# Patient Record
Sex: FEMALE | Race: WHITE | NOT HISPANIC OR LATINO | Employment: OTHER | URBAN - METROPOLITAN AREA
[De-identification: names, ages, dates, MRNs, and addresses within clinical notes are randomized per-mention and may not be internally consistent; named-entity substitution may affect disease eponyms.]

---

## 2022-10-12 ENCOUNTER — OFFICE VISIT (OUTPATIENT)
Dept: UROLOGY | Facility: CLINIC | Age: 72
End: 2022-10-12
Attending: OBSTETRICS & GYNECOLOGY
Payer: MEDICARE

## 2022-10-12 VITALS — HEART RATE: 60 BPM | DIASTOLIC BLOOD PRESSURE: 94 MMHG | HEIGHT: 65 IN | SYSTOLIC BLOOD PRESSURE: 163 MMHG

## 2022-10-12 DIAGNOSIS — N89.6: ICD-10-CM

## 2022-10-12 DIAGNOSIS — N39.46 MIXED INCONTINENCE URGE AND STRESS: Primary | ICD-10-CM

## 2022-10-12 PROCEDURE — 99204 OFFICE O/P NEW MOD 45 MIN: CPT | Performed by: OBSTETRICS & GYNECOLOGY

## 2022-10-12 PROCEDURE — G0463 HOSPITAL OUTPT CLINIC VISIT: HCPCS

## 2022-10-12 RX ORDER — CELECOXIB 200 MG/1
CAPSULE ORAL
COMMUNITY

## 2022-10-12 RX ORDER — MOMETASONE FUROATE MONOHYDRATE 50 UG/1
2 SPRAY, METERED NASAL PRN
COMMUNITY

## 2022-10-12 RX ORDER — LISINOPRIL 40 MG/1
30 TABLET ORAL
COMMUNITY
Start: 2022-10-05

## 2022-10-12 RX ORDER — BIMATOPROST 3 UG/ML
SOLUTION TOPICAL
COMMUNITY
Start: 2022-09-06

## 2022-10-12 RX ORDER — CALCIUM CARBONATE 500(1250)
200-400 TABLET,CHEWABLE ORAL
COMMUNITY

## 2022-10-12 RX ORDER — ESTRADIOL 1 MG/1
1 TABLET ORAL
COMMUNITY
Start: 2021-10-21 | End: 2023-07-01

## 2022-10-12 RX ORDER — LEVOTHYROXINE SODIUM 112 UG/1
112 TABLET ORAL
COMMUNITY
Start: 2022-07-01

## 2022-10-12 RX ORDER — CETIRIZINE HYDROCHLORIDE 10 MG/1
10 TABLET ORAL
COMMUNITY

## 2022-10-12 RX ORDER — VALACYCLOVIR HYDROCHLORIDE 1 G/1
TABLET, FILM COATED ORAL
COMMUNITY
Start: 2022-03-24

## 2022-10-12 RX ORDER — SOLIFENACIN SUCCINATE 10 MG/1
10 TABLET, FILM COATED ORAL DAILY
COMMUNITY

## 2022-10-12 RX ORDER — TRETINOIN 0.25 MG/G
CREAM TOPICAL
COMMUNITY

## 2022-10-12 RX ORDER — ATORVASTATIN CALCIUM 20 MG/1
20 TABLET, FILM COATED ORAL PRN
COMMUNITY
Start: 2022-07-01 | End: 2024-08-29

## 2022-10-12 RX ORDER — ASPIRIN 81 MG/1
81 TABLET, CHEWABLE ORAL
COMMUNITY
Start: 2022-05-14

## 2022-10-12 RX ORDER — GUAIFENESIN 600 MG/1
600 TABLET, EXTENDED RELEASE ORAL
COMMUNITY

## 2022-10-12 ASSESSMENT — PAIN SCALES - GENERAL: PAINLEVEL: NO PAIN (0)

## 2022-10-12 NOTE — PROGRESS NOTES
October 12, 2022    Referring Provider: Referred Self, MD  No address on file    Primary Care Provider: Brittnee Pacheco    CC: urinary incontinence    HPI:  Daisy Serna is a 72 year old female who presents for evaluation of her pelvic floor symptoms.  She has undergone 3 slings for BHAVANA, and a posterior repair for a rectocele. She now has mixed urinary incontinence, U>S. She has tried multiple meds for her UUI, without much success, currently on vesicare    She also feels that her posterior repair left her with a narrowed vaginal opening that will sometimes tear when she is constipated and needs to bear down to have a BM.     Prolapse:  Do you feel a vaginal bulge? no                                      Pressure? no   Do you have to place your fingers in the vagina or in the rectum to have a bowel movement? -  Impact to quality of life? -     Stress Incontinence:  Do you leak urine with cough, sneeze, exercise? yes  How often do you leak with cough, sneeze, exercise?  daily  How much do you usually leak? More than drops   Do you wear a pad? yes If so; large  Impact to quality of life? moderate    Urge Incontinence:  Do you often get sudden urges to urinate? yes  How often do have urges? daily  If so, do you leak with these urges? yes  How much do you usually leak? More than drops  Impact to quality of life? moderate    Prior therapy:  Ever done pelvic floor physical therapy? yes  Trial of medication? yes  Have you ever tried a pessary? no    Medical History:  Do you have?       Past Medical History:   Diagnosis Date     Mixed incontinence      Rectocele      Seasonal allergic rhinitis        Past Surgical History:   Procedure Laterality Date     HYSTERECTOMY      with sling     ooperectomy Bilateral      RECTOCELE REPAIR  2018       Social History     Socioeconomic History     Marital status:      Spouse name: Not on file     Number of children: Not on file     Years of education: Not on file      Highest education level: Not on file   Occupational History     Not on file   Tobacco Use     Smoking status: Never     Smokeless tobacco: Never   Vaping Use     Vaping Use: Never used   Substance and Sexual Activity     Alcohol use: Yes     Alcohol/week: 2.0 standard drinks     Types: 2 Standard drinks or equivalent per week     Drug use: Never     Sexual activity: Not on file   Other Topics Concern     Not on file   Social History Narrative     Not on file     Social Determinants of Health     Financial Resource Strain: Not on file   Food Insecurity: Not on file   Transportation Needs: Not on file   Physical Activity: Not on file   Stress: Not on file   Social Connections: Not on file   Intimate Partner Violence: Not on file   Housing Stability: Not on file       No family history on file.    ROS    Allergies   Allergen Reactions     Adhesive Tape Rash     Extract Of Poison Ivy Itching     Treats with prednisone p.r.n. due to significant reaction  Treats with prednisone p.r.n. due to significant reaction       Gramineae Pollens Other (See Comments) and Unknown     Itchy throat, cough, runny nose  Itchy throat, cough, runny nose         Current Outpatient Medications   Medication     aspirin (ASA) 81 MG chewable tablet     atorvastatin (LIPITOR) 20 MG tablet     bimatoprost (LATISSE) 0.03 % external opthalmic solution     estradiol (ESTRACE) 1 MG tablet     levothyroxine (SYNTHROID/LEVOTHROID) 112 MCG tablet     lisinopril (ZESTRIL) 40 MG tablet     solifenacin (VESICARE) 10 MG tablet     valACYclovir (VALTREX) 1000 mg tablet     Azelaic Acid 15 % FOAM     calcium carbonate 500 mg, elemental, 1250 (500 Ca) MG tablet chewable     celecoxib (CELEBREX) 200 MG capsule     cetirizine (ZYRTEC) 10 MG tablet     guaiFENesin (MUCINEX) 600 MG 12 hr tablet     mometasone (NASONEX) 50 MCG/ACT nasal spray     omeprazole (PRILOSEC) 20 MG DR capsule     tretinoin (RETIN-A) 0.025 % external cream     No current  facility-administered medications for this visit.       There were no vitals taken for this visit. No LMP recorded. There is no height or weight on file to calculate BMI.  Dr. Helton is alert, comfortable in no acute distress, non-labored breathing.   Abdomen is soft, non-tender, non-distended, no CVAT.    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 The urethra was normal appearing.    She has good support on supine strain.  Unable to do a speculum exam due to her vaginal narrowing     A/P: Daisy KEEN DanieTrevor is a 72 year old F with Mixed urinary incontinence U>S    We discussed the diagnosis and treatment options. Pt has urgency and urge incontinence. Treatment options to include:  1) observation with f/u in 6 -12 months  2) pelvic floor physical therapy and bladder training  3) anti-cholinergic medications  4) PTNS  5) Botox injections  6) SNM    Pt would like to try PTNS. She lives in [place] and will check to see if it is available in that area. Will set up for [facility] if it is not.    I spent a total of 45 minutes with  Dr. Helton  on the date of the encounter in chart review, face to face patient visit, review of tests, documentation and/or discussion with other providers about the issues documented above.    Pratik Naranjo MD  Professor, OB/GYN  Urogynecologist  CC  Patient Care Team:  Brittnee Pacheco MD as PCP - General (Family Medicine)  Pratik Naranjo MD as MD (OB/Gyn)  SELF, REFERRED

## 2022-10-12 NOTE — LETTER
10/12/2022       RE: Daisy Serna  [address]  [facility] [address]     Dear Colleague,    Thank you for referring your patient, Daisy Serna, to the [facility] [place] [facility]. Please see a copy of my visit note below.    October 12, 2022    Referring Provider: Referred Self, MD  No address on file    Primary Care Provider: Brittnee Pacheco    CC: urinary incontinence    HPI:  Daisy Serna is a 72 year old female who presents for evaluation of her pelvic floor symptoms.  She has undergone 3 slings for BHAVANA, and a posterior repair for a rectocele. She now has mixed urinary incontinence, U>S. She has tried multiple meds for her UUI, without much success, currently on vesicare    She also feels that her posterior repair left her with a narrowed vaginal opening that will sometimes tear when she is constipated and needs to bear down to have a BM.     Prolapse:  Do you feel a vaginal bulge? no                                      Pressure? no   Do you have to place your fingers in the vagina or in the rectum to have a bowel movement? -  Impact to quality of life? -     Stress Incontinence:  Do you leak urine with cough, sneeze, exercise? yes  How often do you leak with cough, sneeze, exercise?  daily  How much do you usually leak? More than drops   Do you wear a pad? yes If so; large  Impact to quality of life? moderate    Urge Incontinence:  Do you often get sudden urges to urinate? yes  How often do have urges? daily  If so, do you leak with these urges? yes  How much do you usually leak? More than drops  Impact to quality of life? moderate    Prior therapy:  Ever done pelvic floor physical therapy? yes  Trial of medication? yes  Have you ever tried a pessary? no    Medical History:  Do you have?       Past Medical History:   Diagnosis Date     Mixed incontinence      Rectocele      Seasonal allergic rhinitis         Past Surgical History:   Procedure Laterality Date     HYSTERECTOMY      with sling     ooperectomy Bilateral      RECTOCELE REPAIR  2018       Social History     Socioeconomic History     Marital status:      Spouse name: Not on file     Number of children: Not on file     Years of education: Not on file     Highest education level: Not on file   Occupational History     Not on file   Tobacco Use     Smoking status: Never     Smokeless tobacco: Never   Vaping Use     Vaping Use: Never used   Substance and Sexual Activity     Alcohol use: Yes     Alcohol/week: 2.0 standard drinks     Types: 2 Standard drinks or equivalent per week     Drug use: Never     Sexual activity: Not on file   Other Topics Concern     Not on file   Social History Narrative     Not on file     Social Determinants of Health     Financial Resource Strain: Not on file   Food Insecurity: Not on file   Transportation Needs: Not on file   Physical Activity: Not on file   Stress: Not on file   Social Connections: Not on file   Intimate Partner Violence: Not on file   Housing Stability: Not on file       No family history on file.    ROS    Allergies   Allergen Reactions     Adhesive Tape Rash     Extract Of Poison Ivy Itching     Treats with prednisone p.r.n. due to significant reaction  Treats with prednisone p.r.n. due to significant reaction       Gramineae Pollens Other (See Comments) and Unknown     Itchy throat, cough, runny nose  Itchy throat, cough, runny nose         Current Outpatient Medications   Medication     aspirin (ASA) 81 MG chewable tablet     atorvastatin (LIPITOR) 20 MG tablet     bimatoprost (LATISSE) 0.03 % external opthalmic solution     estradiol (ESTRACE) 1 MG tablet     levothyroxine (SYNTHROID/LEVOTHROID) 112 MCG tablet     lisinopril (ZESTRIL) 40 MG tablet     solifenacin (VESICARE) 10 MG tablet     valACYclovir (VALTREX) 1000 mg tablet     Azelaic Acid 15 % FOAM     calcium carbonate 500 mg, elemental,  1250 (500 Ca) MG tablet chewable     celecoxib (CELEBREX) 200 MG capsule     cetirizine (ZYRTEC) 10 MG tablet     guaiFENesin (MUCINEX) 600 MG 12 hr tablet     mometasone (NASONEX) 50 MCG/ACT nasal spray     omeprazole (PRILOSEC) 20 MG DR capsule     tretinoin (RETIN-A) 0.025 % external cream     No current facility-administered medications for this visit.       There were no vitals taken for this visit. No LMP recorded. There is no height or weight on file to calculate BMI.  Dr. Helton is alert, comfortable in no acute distress, non-labored breathing.   Abdomen is soft, non-tender, non-distended, no CVAT.    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 The urethra was normal appearing.    She has good support on supine strain.  Unable to do a speculum exam due to her vaginal narrowing     A/P: Daisy KEEN Daphne is a 72 year old F with Mixed urinary incontinence U>S    We discussed the diagnosis and treatment options. Pt has urgency and urge incontinence. Treatment options to include:  1) observation with f/u in 6 -12 months  2) pelvic floor physical therapy and bladder training  3) anti-cholinergic medications  4) PTNS  5) Botox injections  6) SNM    Pt would like to try PTNS. She lives in [place] and will check to see if it is available in that area. Will set up for [facility] if it is not.    I spent a total of 45 minutes with  Dr. Helton  on the date of the encounter in chart review, face to face patient visit, review of tests, documentation and/or discussion with other providers about the issues documented above.    Pratik Naranjo MD  Professor, OB/GYN  Urogynecologist  CC  Patient Care Team:  Brittnee Pacheco MD as PCP - General (Family Medicine)  Pratik Naranjo MD as MD (OB/Gyn)  SELF, REFERRED

## 2022-10-13 ENCOUNTER — TELEPHONE (OUTPATIENT)
Dept: UROLOGY | Facility: CLINIC | Age: 72
End: 2022-10-13

## 2022-10-13 DIAGNOSIS — S39.94XS: Primary | ICD-10-CM

## 2022-10-13 NOTE — TELEPHONE ENCOUNTER
[facility] Call Center    Phone Message    May a detailed message be left on voicemail: yes     Reason for Call: Pt returning call after appt yesterday. She has decided to go forward with PTNS treatments and minor surgery procedure that was discussed. Please call pt to set up. Thank     Action Taken: Message routed to:  [facility] Uro    Travel Screening: Not Applicable

## 2022-10-14 ENCOUNTER — OFFICE VISIT (OUTPATIENT)
Dept: UROLOGY | Facility: CLINIC | Age: 72
End: 2022-10-14
Payer: MEDICARE

## 2022-10-14 DIAGNOSIS — N39.46 MIXED INCONTINENCE URGE AND STRESS: Primary | ICD-10-CM

## 2022-10-14 DIAGNOSIS — N32.81 OVERACTIVE BLADDER: ICD-10-CM

## 2022-10-14 PROBLEM — N18.30 CKD (CHRONIC KIDNEY DISEASE) STAGE 3, GFR 30-59 ML/MIN (H): Status: ACTIVE | Noted: 2019-06-16

## 2022-10-14 PROBLEM — M19.90 OSTEOARTHROSIS: Status: ACTIVE | Noted: 2022-10-14

## 2022-10-14 PROBLEM — N81.10 CYSTOCELE WITH RECTOCELE: Status: ACTIVE | Noted: 2018-08-24

## 2022-10-14 PROBLEM — K63.5 COLON POLYP: Status: ACTIVE | Noted: 2022-10-14

## 2022-10-14 PROBLEM — E03.9 ACQUIRED HYPOTHYROIDISM: Status: ACTIVE | Noted: 2021-10-01

## 2022-10-14 PROBLEM — I73.00 RAYNAUD'S DISEASE: Status: ACTIVE | Noted: 2021-10-01

## 2022-10-14 PROBLEM — N81.6 CYSTOCELE WITH RECTOCELE: Status: ACTIVE | Noted: 2018-08-24

## 2022-10-14 PROCEDURE — 64566 NEUROELTRD STIM POST TIBIAL: CPT

## 2022-10-14 NOTE — PATIENT INSTRUCTIONS
Please schedule the rest of your 12 weekly PTNS treatment.    It was a pleasure meeting with you today.  Thank you for allowing me and my team the privilege of caring for you today.  YOU are the reason we are here, and I truly hope we provided you with the excellent service you deserve.  Please let us know if there is anything else we can do for you so that we can be sure you are leaving completely satisfied with your care experience.      Joelle Pires, CMA

## 2022-10-14 NOTE — PROGRESS NOTES
Chief Complaint   Patient presents with     Allied Health Visit     PTNS #1 of 12       There is no problem list on file for this patient.      Allergies   Allergen Reactions     Adhesive Tape Rash     Extract Of Poison Ivy Itching     Treats with prednisone p.r.n. due to significant reaction  Treats with prednisone p.r.n. due to significant reaction       Gramineae Pollens Other (See Comments) and Unknown     Itchy throat, cough, runny nose  Itchy throat, cough, runny nose         Current Outpatient Medications   Medication Sig Dispense Refill     aspirin (ASA) 81 MG chewable tablet Take 81 mg by mouth       atorvastatin (LIPITOR) 20 MG tablet Take 20 mg by mouth as needed       Azelaic Acid 15 % FOAM        bimatoprost (LATISSE) 0.03 % external opthalmic solution        calcium carbonate 500 mg, elemental, 1250 (500 Ca) MG tablet chewable Take 200-400 mg by mouth       celecoxib (CELEBREX) 200 MG capsule        cetirizine (ZYRTEC) 10 MG tablet Take 10 mg by mouth       estradiol (ESTRACE) 1 MG tablet Take 1 mg by mouth       guaiFENesin (MUCINEX) 600 MG 12 hr tablet Take 600 mg by mouth       levothyroxine (SYNTHROID/LEVOTHROID) 112 MCG tablet Take 112 mcg by mouth every 48 hours       lisinopril (ZESTRIL) 40 MG tablet Take 40 mg by mouth       mometasone (NASONEX) 50 MCG/ACT nasal spray Spray 2 sprays in nostril as needed       omeprazole (PRILOSEC) 20 MG DR capsule Take 20 mg by mouth       solifenacin (VESICARE) 10 MG tablet Take 10 mg by mouth daily       tretinoin (RETIN-A) 0.025 % external cream        valACYclovir (VALTREX) 1000 mg tablet daily as needed.         Social History     Tobacco Use     Smoking status: Never     Smokeless tobacco: Never   Vaping Use     Vaping Use: Never used   Substance Use Topics     Alcohol use: Yes     Alcohol/week: 2.0 standard drinks     Types: 2 Standard drinks or equivalent per week     Drug use: Never       Daisy Serna comes into clinic today at the request  of Referred Self for PTNS treatment.    This service provided today was under the direct supervision of Dr. Bozena Ledesma, who was available if needed.    Percutaneous Tibial Nerve Stimulation (PTNS)    Treatment number : 1    Percutaneous tibial nerve stimulation (PTNS) was prescribed for Daisy HeltonDanaHosea's Overactive Bladder symptoms of urge incontinence. The needle electrode was inserted into the lower, inner aspect of the right leg. The surface electrode was placed on the inside arch of the foot on the treatment leg. The lead set was connected to the stimulator, and the needle electrode clip was connected to the needle electrode. The stimulator that produces an adjustable electrical pulse that travels to the sacral nerve plexus via the tibial nerve was adjusted to a setting of 8 with good toe curl noted. The voiding diary or patient's symptoms were reviewed prior to the start of treatment. This is the patients   Additional notes: pt to schedule more visits.    Joelle Pires CMA  10/14/2022  1:58 PM

## 2022-10-14 NOTE — TELEPHONE ENCOUNTER
Spoke to the patient about the minor surgery she has mentioned. According to the clinic notes from 10/12, Dr. Naranjo discussed botox injections and sacral neuromodulation. However, the patient does not want to proceed with either procedures and would like to talk to Dr. Naranjo. Patient has Dr. Naranjo number and plans to call him. Writer will also route the message to Dr. Naranjo for clarification.

## 2022-10-15 RX ORDER — ACETAMINOPHEN 325 MG/1
975 TABLET ORAL ONCE
Status: CANCELLED | OUTPATIENT
Start: 2022-10-15 | End: 2022-10-15

## 2022-10-17 ENCOUNTER — TELEPHONE (OUTPATIENT)
Dept: OBGYN | Facility: CLINIC | Age: 72
End: 2022-10-17

## 2022-10-17 PROBLEM — S39.94XS: Status: ACTIVE | Noted: 2022-10-17

## 2022-10-17 NOTE — TELEPHONE ENCOUNTER
Left message for patient to call back and schedule surgery.    Sidra Zamora  Clinical Services Assistant

## 2022-10-18 ENCOUNTER — OFFICE VISIT (OUTPATIENT)
Dept: UROLOGY | Facility: CLINIC | Age: 72
End: 2022-10-18
Payer: MEDICARE

## 2022-10-18 DIAGNOSIS — N39.46 MIXED INCONTINENCE URGE AND STRESS: ICD-10-CM

## 2022-10-18 DIAGNOSIS — N32.81 OVERACTIVE BLADDER: Primary | ICD-10-CM

## 2022-10-18 PROCEDURE — 64566 NEUROELTRD STIM POST TIBIAL: CPT

## 2022-10-18 NOTE — PROGRESS NOTES
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 due to significant reaction  Treats with prednisone p.r.n. due to significant reaction       Gramineae Pollens Other (See Comments) and Unknown     Itchy throat, cough, runny nose  Itchy throat, cough, runny nose         Current Outpatient Medications   Medication Sig Dispense Refill     aspirin (ASA) 81 MG chewable tablet Take 81 mg by mouth       atorvastatin (LIPITOR) 20 MG tablet Take 20 mg by mouth as needed       Azelaic Acid 15 % FOAM        bimatoprost (LATISSE) 0.03 % external opthalmic solution        calcium carbonate 500 mg, elemental, 1250 (500 Ca) MG tablet chewable Take 200-400 mg by mouth       celecoxib (CELEBREX) 200 MG capsule        cetirizine (ZYRTEC) 10 MG tablet Take 10 mg by mouth       estradiol (ESTRACE) 1 MG tablet Take 1 mg by mouth       guaiFENesin (MUCINEX) 600 MG 12 hr tablet Take 600 mg by mouth       levothyroxine (SYNTHROID/LEVOTHROID) 112 MCG tablet Take 112 mcg by mouth every 48 hours       lisinopril (ZESTRIL) 40 MG tablet Take 40 mg by mouth       mometasone (NASONEX) 50 MCG/ACT nasal spray Spray 2 sprays in nostril as needed       omeprazole (PRILOSEC) 20 MG DR capsule Take 20 mg by mouth       solifenacin (VESICARE) 10 MG tablet Take 10 mg by mouth daily       tretinoin (RETIN-A) 0.025 % external cream        valACYclovir (VALTREX) 1000 mg tablet daily as needed.         Social History     Tobacco Use     Smoking status: Never     Smokeless tobacco: Never   Vaping Use     Vaping Use: Never used   Substance  Use Topics     Alcohol use: Yes     Alcohol/week: 2.0 standard drinks     Types: 2 Standard drinks or equivalent per week     Drug use: Never       Daisy Jc comes into clinic today at the request of Dr. Pratik Naranjo for PTNS treatment.    This service provided today was under the direct supervision of Dr. Moore, who was available if needed.    Percutaneous Tibial Nerve Stimulation (PTNS)    Treatment number : 2    Percutaneous tibial nerve stimulation (PTNS) was prescribed for Daisy Jc's Overactive Bladder symptoms of urge incontinence. The needle electrode was inserted into the lower, inner aspect of the right leg. The surface electrode was placed on the inside arch of the foot on the treatment leg. The lead set was connected to the stimulator, and the needle electrode clip was connected to the needle electrode. The stimulator that produces an adjustable electrical pulse that travels to the sacral nerve plexus via the tibial nerve was adjusted to a setting of 2 with good toe curl noted. The voiding diary or patient's symptoms were reviewed prior to the start of treatment. The patient experienced no changes since the last treatment.    Hernandez Downs EMT  10/18/2022  3:29 PM

## 2022-10-24 ENCOUNTER — OFFICE VISIT (OUTPATIENT)
Dept: UROLOGY | Facility: CLINIC | Age: 72
End: 2022-10-24
Payer: MEDICARE

## 2022-10-24 DIAGNOSIS — N32.81 OVERACTIVE BLADDER: Primary | ICD-10-CM

## 2022-10-24 PROCEDURE — 64566 NEUROELTRD STIM POST TIBIAL: CPT

## 2022-10-24 NOTE — PROGRESS NOTES
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 due to significant reaction  Treats with prednisone p.r.n. due to significant reaction       Gramineae Pollens Other (See Comments) and Unknown     Itchy throat, cough, runny nose  Itchy throat, cough, runny nose         Current Outpatient Medications   Medication Sig Dispense Refill     aspirin (ASA) 81 MG chewable tablet Take 81 mg by mouth       atorvastatin (LIPITOR) 20 MG tablet Take 20 mg by mouth as needed       Azelaic Acid 15 % FOAM        bimatoprost (LATISSE) 0.03 % external opthalmic solution        calcium carbonate 500 mg, elemental, 1250 (500 Ca) MG tablet chewable Take 200-400 mg by mouth       celecoxib (CELEBREX) 200 MG capsule        cetirizine (ZYRTEC) 10 MG tablet Take 10 mg by mouth       estradiol (ESTRACE) 1 MG tablet Take 1 mg by mouth       guaiFENesin (MUCINEX) 600 MG 12 hr tablet Take 600 mg by mouth       levothyroxine (SYNTHROID/LEVOTHROID) 112 MCG tablet Take 112 mcg by mouth every 48 hours       lisinopril (ZESTRIL) 40 MG tablet Take 40 mg by mouth       mometasone (NASONEX) 50 MCG/ACT nasal spray Spray 2 sprays in nostril as needed       omeprazole (PRILOSEC) 20 MG DR capsule Take 20 mg by mouth       solifenacin (VESICARE) 10 MG tablet Take 10 mg by mouth daily       tretinoin (RETIN-A) 0.025 % external cream        valACYclovir (VALTREX) 1000 mg tablet daily as needed.         Social History     Tobacco Use     Smoking status: Never     Smokeless tobacco: Never   Vaping Use     Vaping Use: Never used    Substance Use Topics     Alcohol use: Yes     Alcohol/week: 2.0 standard drinks     Types: 2 Standard drinks or equivalent per week     Drug use: Never       Daisy Jc comes into clinic today at the request of Dr. Naranjo for PTNS treatment.    This service provided today was under the direct supervision of Dr. Woodard, who was available if needed.    Percutaneous Tibial Nerve Stimulation (PTNS)    Treatment number : 3    Percutaneous tibial nerve stimulation (PTNS) was prescribed for Daisy Jc's Overactive Bladder symptoms of urge incontinence. The needle electrode was inserted into the lower, inner aspect of the right leg. The surface electrode was placed on the inside arch of the foot on the treatment leg. The lead set was connected to the stimulator, and the needle electrode clip was connected to the needle electrode. The stimulator that produces an adjustable electrical pulse that travels to the sacral nerve plexus via the tibial nerve was adjusted to a setting of 2 with good toe curl noted. The voiding diary or patient's symptoms were reviewed prior to the start of treatment. The patient experienced decrease in incontinence episodes since the last treatment.    Hernandez Downs EMT  10/24/2022  8:53 AM

## 2022-10-25 ENCOUNTER — PATIENT OUTREACH (OUTPATIENT)
Dept: UROLOGY | Facility: CLINIC | Age: 72
End: 2022-10-25

## 2022-10-25 NOTE — TELEPHONE ENCOUNTER
Patient was here in the clinic yesterday. Asked to speak with the care coordinator to discuss details of her procedure. This writer discussed location, medication, day of procedure, soap washing instructions, who to call with questions,  services, and pre-op physical which is having today with her primary, and everything else she had questions on. Patient did verbalize understanding and will call if has any other questions.    Amanda Jo RN, BSN  Care Coordinator Urology

## 2022-10-27 ENCOUNTER — TELEPHONE (OUTPATIENT)
Dept: OBGYN | Facility: CLINIC | Age: 72
End: 2022-10-27

## 2022-10-27 NOTE — TELEPHONE ENCOUNTER
Pre op exam received from [facility] for surgery with Dr. Naranjo on 10-31-22  Sent to urgent scanning and copy in clinic-  May also see it in car ever ywhere under [facility].

## 2022-10-28 ENCOUNTER — ANESTHESIA EVENT (OUTPATIENT)
Dept: SURGERY | Facility: AMBULATORY SURGERY CENTER | Age: 72
End: 2022-10-28
Payer: MEDICARE

## 2022-10-31 ENCOUNTER — HOSPITAL ENCOUNTER (OUTPATIENT)
Facility: AMBULATORY SURGERY CENTER | Age: 72
Discharge: HOME | End: 2022-10-31
Attending: OBSTETRICS & GYNECOLOGY
Payer: MEDICARE

## 2022-10-31 ENCOUNTER — ANESTHESIA (OUTPATIENT)
Dept: SURGERY | Facility: AMBULATORY SURGERY CENTER | Age: 72
End: 2022-10-31
Payer: MEDICARE

## 2022-10-31 ENCOUNTER — OFFICE VISIT (OUTPATIENT)
Dept: UROLOGY | Facility: CLINIC | Age: 72
End: 2022-10-31
Payer: MEDICARE

## 2022-10-31 VITALS
HEIGHT: 65 IN | RESPIRATION RATE: 16 BRPM | TEMPERATURE: 97 F | SYSTOLIC BLOOD PRESSURE: 112 MMHG | HEART RATE: 61 BPM | BODY MASS INDEX: 29.16 KG/M2 | DIASTOLIC BLOOD PRESSURE: 67 MMHG | WEIGHT: 175 LBS | OXYGEN SATURATION: 96 %

## 2022-10-31 DIAGNOSIS — N32.81 OVERACTIVE BLADDER: Primary | ICD-10-CM

## 2022-10-31 DIAGNOSIS — S39.94XS: ICD-10-CM

## 2022-10-31 DIAGNOSIS — N95.2 VAGINAL ATROPHY: Primary | ICD-10-CM

## 2022-10-31 PROCEDURE — 58999 UNLISTED PX FML GENITAL SYS: CPT | Mod: GC | Performed by: OBSTETRICS & GYNECOLOGY

## 2022-10-31 PROCEDURE — 999N000127 HC STATISTIC PERIPHERAL IV START W US GUIDANCE

## 2022-10-31 PROCEDURE — 64566 NEUROELTRD STIM POST TIBIAL: CPT

## 2022-10-31 PROCEDURE — 58999 UNLISTED PX FML GENITAL SYS: CPT

## 2022-10-31 RX ORDER — SODIUM CHLORIDE, SODIUM LACTATE, POTASSIUM CHLORIDE, CALCIUM CHLORIDE 600; 310; 30; 20 MG/100ML; MG/100ML; MG/100ML; MG/100ML
INJECTION, SOLUTION INTRAVENOUS CONTINUOUS
Status: DISCONTINUED | OUTPATIENT
Start: 2022-10-31 | End: 2022-11-01 | Stop reason: HOSPADM

## 2022-10-31 RX ORDER — IBUPROFEN 200 MG
600 TABLET ORAL ONCE
Status: DISCONTINUED | OUTPATIENT
Start: 2022-10-31 | End: 2022-11-01 | Stop reason: HOSPADM

## 2022-10-31 RX ORDER — PROPOFOL 10 MG/ML
INJECTION, EMULSION INTRAVENOUS CONTINUOUS PRN
Status: DISCONTINUED | OUTPATIENT
Start: 2022-10-31 | End: 2022-10-31

## 2022-10-31 RX ORDER — MEPERIDINE HYDROCHLORIDE 25 MG/ML
12.5 INJECTION INTRAMUSCULAR; INTRAVENOUS; SUBCUTANEOUS
Status: DISCONTINUED | OUTPATIENT
Start: 2022-10-31 | End: 2022-11-01 | Stop reason: HOSPADM

## 2022-10-31 RX ORDER — OXYCODONE HYDROCHLORIDE 5 MG/1
5-10 TABLET ORAL EVERY 4 HOURS PRN
Qty: 6 TABLET | Refills: 0 | Status: SHIPPED | OUTPATIENT
Start: 2022-10-31 | End: 2024-08-29

## 2022-10-31 RX ORDER — LIDOCAINE HYDROCHLORIDE 20 MG/ML
INJECTION, SOLUTION INFILTRATION; PERINEURAL PRN
Status: DISCONTINUED | OUTPATIENT
Start: 2022-10-31 | End: 2022-10-31

## 2022-10-31 RX ORDER — OXYCODONE HYDROCHLORIDE 5 MG/1
5 TABLET ORAL
Status: DISCONTINUED | OUTPATIENT
Start: 2022-10-31 | End: 2022-11-01 | Stop reason: HOSPADM

## 2022-10-31 RX ORDER — FENTANYL CITRATE 50 UG/ML
25 INJECTION, SOLUTION INTRAMUSCULAR; INTRAVENOUS EVERY 5 MIN PRN
Status: DISCONTINUED | OUTPATIENT
Start: 2022-10-31 | End: 2022-10-31 | Stop reason: HOSPADM

## 2022-10-31 RX ORDER — CEFAZOLIN SODIUM 2 G/50ML
2 SOLUTION INTRAVENOUS SEE ADMIN INSTRUCTIONS
Status: DISCONTINUED | OUTPATIENT
Start: 2022-10-31 | End: 2022-10-31 | Stop reason: HOSPADM

## 2022-10-31 RX ORDER — ACETAMINOPHEN 325 MG/1
325-650 TABLET ORAL EVERY 6 HOURS PRN
Qty: 50 TABLET | Refills: 0 | Status: SHIPPED | OUTPATIENT
Start: 2022-10-31

## 2022-10-31 RX ORDER — SODIUM CHLORIDE, SODIUM LACTATE, POTASSIUM CHLORIDE, CALCIUM CHLORIDE 600; 310; 30; 20 MG/100ML; MG/100ML; MG/100ML; MG/100ML
INJECTION, SOLUTION INTRAVENOUS CONTINUOUS
Status: DISCONTINUED | OUTPATIENT
Start: 2022-10-31 | End: 2022-10-31 | Stop reason: HOSPADM

## 2022-10-31 RX ORDER — ACETAMINOPHEN 325 MG/1
975 TABLET ORAL ONCE
Status: DISCONTINUED | OUTPATIENT
Start: 2022-10-31 | End: 2022-11-01 | Stop reason: HOSPADM

## 2022-10-31 RX ORDER — HYDROMORPHONE HYDROCHLORIDE 1 MG/ML
0.2 INJECTION, SOLUTION INTRAMUSCULAR; INTRAVENOUS; SUBCUTANEOUS EVERY 5 MIN PRN
Status: DISCONTINUED | OUTPATIENT
Start: 2022-10-31 | End: 2022-10-31 | Stop reason: HOSPADM

## 2022-10-31 RX ORDER — ACETAMINOPHEN 325 MG/1
975 TABLET ORAL ONCE
Status: DISCONTINUED | OUTPATIENT
Start: 2022-10-31 | End: 2022-10-31 | Stop reason: HOSPADM

## 2022-10-31 RX ORDER — ONDANSETRON 2 MG/ML
INJECTION INTRAMUSCULAR; INTRAVENOUS PRN
Status: DISCONTINUED | OUTPATIENT
Start: 2022-10-31 | End: 2022-10-31

## 2022-10-31 RX ORDER — ESTRADIOL 0.1 MG/G
1 CREAM VAGINAL DAILY
Qty: 42.5 G | Refills: 3 | Status: SHIPPED | OUTPATIENT
Start: 2022-10-31 | End: 2023-01-20

## 2022-10-31 RX ORDER — LIDOCAINE HYDROCHLORIDE AND EPINEPHRINE 10; 10 MG/ML; UG/ML
INJECTION, SOLUTION INFILTRATION; PERINEURAL PRN
Status: DISCONTINUED | OUTPATIENT
Start: 2022-10-31 | End: 2022-10-31 | Stop reason: HOSPADM

## 2022-10-31 RX ORDER — ONDANSETRON 4 MG/1
4 TABLET, ORALLY DISINTEGRATING ORAL EVERY 30 MIN PRN
Status: DISCONTINUED | OUTPATIENT
Start: 2022-10-31 | End: 2022-11-01 | Stop reason: HOSPADM

## 2022-10-31 RX ORDER — ACETAMINOPHEN 325 MG/1
975 TABLET ORAL ONCE
Status: COMPLETED | OUTPATIENT
Start: 2022-10-31 | End: 2022-10-31

## 2022-10-31 RX ORDER — ONDANSETRON 2 MG/ML
4 INJECTION INTRAMUSCULAR; INTRAVENOUS EVERY 30 MIN PRN
Status: DISCONTINUED | OUTPATIENT
Start: 2022-10-31 | End: 2022-11-01 | Stop reason: HOSPADM

## 2022-10-31 RX ORDER — CEFAZOLIN SODIUM 2 G/50ML
2 SOLUTION INTRAVENOUS
Status: COMPLETED | OUTPATIENT
Start: 2022-10-31 | End: 2022-10-31

## 2022-10-31 RX ORDER — FENTANYL CITRATE 50 UG/ML
INJECTION, SOLUTION INTRAMUSCULAR; INTRAVENOUS PRN
Status: DISCONTINUED | OUTPATIENT
Start: 2022-10-31 | End: 2022-10-31

## 2022-10-31 RX ORDER — OXYCODONE HYDROCHLORIDE 5 MG/1
5 TABLET ORAL EVERY 4 HOURS PRN
Status: DISCONTINUED | OUTPATIENT
Start: 2022-10-31 | End: 2022-11-01 | Stop reason: HOSPADM

## 2022-10-31 RX ORDER — LIDOCAINE 40 MG/G
CREAM TOPICAL
Status: DISCONTINUED | OUTPATIENT
Start: 2022-10-31 | End: 2022-10-31 | Stop reason: HOSPADM

## 2022-10-31 RX ORDER — FENTANYL CITRATE 50 UG/ML
25 INJECTION, SOLUTION INTRAMUSCULAR; INTRAVENOUS
Status: DISCONTINUED | OUTPATIENT
Start: 2022-10-31 | End: 2022-11-01 | Stop reason: HOSPADM

## 2022-10-31 RX ADMIN — LIDOCAINE HYDROCHLORIDE 10 MG: 20 INJECTION, SOLUTION INFILTRATION; PERINEURAL at 09:22

## 2022-10-31 RX ADMIN — ACETAMINOPHEN 975 MG: 325 TABLET ORAL at 08:14

## 2022-10-31 RX ADMIN — SODIUM CHLORIDE, SODIUM LACTATE, POTASSIUM CHLORIDE, CALCIUM CHLORIDE: 600; 310; 30; 20 INJECTION, SOLUTION INTRAVENOUS at 08:24

## 2022-10-31 RX ADMIN — FENTANYL CITRATE 25 MCG: 50 INJECTION, SOLUTION INTRAMUSCULAR; INTRAVENOUS at 09:23

## 2022-10-31 RX ADMIN — FENTANYL CITRATE 25 MCG: 50 INJECTION, SOLUTION INTRAMUSCULAR; INTRAVENOUS at 09:29

## 2022-10-31 RX ADMIN — ONDANSETRON 4 MG: 2 INJECTION INTRAMUSCULAR; INTRAVENOUS at 09:23

## 2022-10-31 RX ADMIN — CEFAZOLIN SODIUM 2 G: 2 SOLUTION INTRAVENOUS at 09:16

## 2022-10-31 RX ADMIN — PROPOFOL 200 MCG/KG/MIN: 10 INJECTION, EMULSION INTRAVENOUS at 09:23

## 2022-10-31 NOTE — ANESTHESIA CARE TRANSFER NOTE
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    Anesthesia Type:   No value filed.     Note:    Oropharynx: oropharynx clear of all foreign objects and spontaneously breathing  Level of Consciousness: awake  Oxygen Supplementation: room air    Independent Airway: airway patency satisfactory and stable  Dentition: dentition unchanged  Vital Signs Stable: post-procedure vital signs reviewed and stable  Report to RN Given: handoff report given  Patient transferred to: Phase II    Handoff Report: Identifed the Patient, Identified the Reponsible Provider, Reviewed the pertinent medical history, Discussed the surgical course, Reviewed Intra-OP anesthesia mangement and issues during anesthesia, Set expectations for post-procedure period and Allowed opportunity for questions and acknowledgement of understanding      Vitals:  Vitals Value Taken Time   BP 93/60 10/31/22 0952   Temp 36.1  C (97  F) 10/31/22 0952   Pulse 67 10/31/22 0952   Resp 16 10/31/22 0952   SpO2 95 % 10/31/22 0952       Electronically Signed By: PATRIC Rodriguez CRNA  October 31, 2022  9:54 AM

## 2022-10-31 NOTE — OP NOTE
DATE OF PROCEDURE: Oct 31, 2022    PREOPERATIVE DIAGNOSIS:   1. Perineal bridge    POSTOPERATIVE DIAGNOSIS:   1. Perineal bridge    PROCEDURE:    1. Revision of perineorraphy    SURGEON: Marcella    RESIDENT(S): Marlon    ANESTHESIA: MAC with local    EBL: <10cc    FLUIDS: 400cc    URINE OUTPUT: not recorded    CONDITION: stable    COMPLICATIONS: none apparent    SPECIMEN: none    IMPLANTS: none    MEDICATIONS: Ancef 2g IV was given prior to the start of the procedure    DRAINS, PACKS: Zamora catheter    INDICATIONS: The patient is a 72 year old female with a previous posterior repair and perineorrhaphy that left her a skin bridge across the vaginal opening and a narrowed vaginal opening who requests surgical intervention.  Prior to the procedure, she was counseled as to the treatment options and their risks/benefits.  She was desirous of surgical intervention consisting of the procedures mentioned above.  She was counseled on the risks/benefits/alternatives/indications of the procedure and written, informed consent was obtained prior to proceeding to the OR.     FINDINGS: Narrowed vaginal opening secondary to previous perineorrhaphy.    OPERATIVE PROCEDURE:  The patient was taken to the operating room where MAC anesthesia was found to be adequate.  She was positioned in dorsal lithotomy position using lise stirrups.  She was then prepped and draped in the normal sterile fashion.      10cc of 1% lidocaine with epinephrine was injected into the perineal skin bridge overlying the vagina. This was incised to the level of the posterior fourchette. The incised edges were repaired with 2-0 vicryl in a simple running fashion.   Pratik Naranjo MD  Professor, OB/GYN  Urogynecologist

## 2022-10-31 NOTE — ANESTHESIA POSTPROCEDURE EVALUATION
Patient: Daisy Jc    Procedure: Procedure(s):  SUTURE REPAIR, PERINEUM       Anesthesia Type:  MAC    Note:  Disposition: Outpatient   Postop Pain Control: Uneventful            Sign Out: Well controlled pain   PONV: No   Neuro/Psych: Uneventful            Sign Out: Acceptable/Baseline neuro status   Airway/Respiratory: Uneventful            Sign Out: Acceptable/Baseline resp. status   CV/Hemodynamics: Uneventful            Sign Out: Acceptable CV status; No obvious hypovolemia; No obvious fluid overload   Other NRE: NONE   DID A NON-ROUTINE EVENT OCCUR? No           Last vitals:  Vitals Value Taken Time   /67 10/31/22 1015   Temp 36.1  C (97  F) 10/31/22 1015   Pulse 61 10/31/22 1015   Resp 16 10/31/22 1015   SpO2 96 % 10/31/22 1015       Electronically Signed By: Baldev Burns MD  October 31, 2022  4:52 PM

## 2022-10-31 NOTE — PROGRESS NOTES
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 due to significant reaction  Treats with prednisone p.r.n. due to significant reaction       Gramineae Pollens Other (See Comments) and Unknown     Itchy throat, cough, runny nose  Itchy throat, cough, runny nose         Current Outpatient Medications   Medication Sig Dispense Refill     acetaminophen (TYLENOL) 325 MG tablet Take 1-2 tablets (325-650 mg) by mouth every 6 hours as needed for mild pain 50 tablet 0     aspirin (ASA) 81 MG chewable tablet Take 81 mg by mouth       atorvastatin (LIPITOR) 20 MG tablet Take 20 mg by mouth as needed       Azelaic Acid 15 % FOAM        bimatoprost (LATISSE) 0.03 % external opthalmic solution        calcium carbonate 500 mg, elemental, 1250 (500 Ca) MG tablet chewable Take 200-400 mg by mouth       celecoxib (CELEBREX) 200 MG capsule        cetirizine (ZYRTEC) 10 MG tablet Take 10 mg by mouth       estradiol (ESTRACE) 1 MG tablet Take 1 mg by mouth       guaiFENesin (MUCINEX) 600 MG 12 hr tablet Take 600 mg by mouth       levothyroxine (SYNTHROID/LEVOTHROID) 112 MCG tablet Take 112 mcg by mouth every 48 hours       lisinopril (ZESTRIL) 40 MG tablet Take 40 mg by mouth       mometasone (NASONEX) 50 MCG/ACT nasal spray Spray 2 sprays in nostril as needed       omeprazole (PRILOSEC) 20 MG DR capsule Take 20 mg by mouth       oxyCODONE (ROXICODONE) 5 MG tablet Take 1-2 tablets (5-10 mg) by mouth every 4 hours as needed for moderate to severe pain 6 tablet 0     solifenacin (VESICARE) 10 MG tablet Take 10 mg by mouth  daily       tretinoin (RETIN-A) 0.025 % external cream        valACYclovir (VALTREX) 1000 mg tablet daily as needed.         Social History     Tobacco Use     Smoking status: Never     Smokeless tobacco: Never   Vaping Use     Vaping Use: Never used   Substance Use Topics     Alcohol use: Yes     Alcohol/week: 2.0 standard drinks     Types: 2 Standard drinks or equivalent per week     Drug use: Never       Daisy Jc comes into clinic today at the request of Dr. Naranjo for PTNS treatment.    This service provided today was under the direct supervision of Dr. Woodard, who was available if needed.    Percutaneous Tibial Nerve Stimulation (PTNS)    Treatment number : 4    Percutaneous tibial nerve stimulation (PTNS) was prescribed for Daisy Jc's Overactive Bladder symptoms of urge incontinence. The needle electrode was inserted into the lower, inner aspect of the right leg. The surface electrode was placed on the inside arch of the foot on the treatment leg. The lead set was connected to the stimulator, and the needle electrode clip was connected to the needle electrode. The stimulator that produces an adjustable electrical pulse that travels to the sacral nerve plexus via the tibial nerve was adjusted to a setting of 5 with good toe curl noted. The voiding diary or patient's symptoms were reviewed prior to the start of treatment. The patient experienced decrease in incontinence episodes since the last treatment.    Hernandez Downs EMT  10/31/2022  10:41 AM

## 2022-10-31 NOTE — ANESTHESIA PREPROCEDURE EVALUATION
Anesthesia Pre-Procedure Evaluation    Patient: Daisy Jc   MRN: [id number] : 1950        Procedure : Procedure(s):  SUTURE REPAIR, PERINEUM          Past Medical History:   Diagnosis Date     Benign essential hypertension      Contact dermatitis due to poison ivy      Gastroesophageal reflux disease with esophagitis      Herpes labialis      History of colonic polyps      Hyperlipidemia LDL goal <100      Hypothyroidism      Mixed incontinence      Osteoarthritis      Ovarian cyst      Raynaud's syndrome      Rectocele      Rosacea      Seasonal allergic rhinitis      Vaginal atrophy      Vaginal atrophy       Past Surgical History:   Procedure Laterality Date     appenectomy       BLADDER SUSPENSION       C/SECTION, LOW TRANSVERSE       C/SECTION, LOW TRANSVERSE       C/SECTION, LOW TRANSVERSE       cateract Bilateral      cholectomy       HYSTERECTOMY      with sling     ooperectomy Bilateral      PANNICULECTOMY       RECTOCELE REPAIR  2018     TOTAL KNEE ARTHROPLASTY Left      TOTAL KNEE ARTHROPLASTY Right      vitrectomy        Allergies   Allergen Reactions     Adhesive Tape Rash     Extract Of Poison Ivy Itching     Treats with prednisone p.r.n. due to significant reaction  Treats with prednisone p.r.n. due to significant reaction       Gramineae Pollens Other (See Comments) and Unknown     Itchy throat, cough, runny nose  Itchy throat, cough, runny nose        Social History     Tobacco Use     Smoking status: Never     Smokeless tobacco: Never   Substance Use Topics     Alcohol use: Yes     Alcohol/week: 2.0 standard drinks     Types: 2 Standard drinks or equivalent per week      Wt Readings from Last 1 Encounters:   10/31/22 79.4 kg (175 lb)        Anesthesia Evaluation   Pt has had prior anesthetic.     No history of anesthetic complications       ROS/MED HX  ENT/Pulmonary:       Neurologic:       Cardiovascular:     (+) hypertension-----    METS/Exercise Tolerance: 3 - Able to walk  1-2 blocks without stopping    Hematologic:       Musculoskeletal:   (+) arthritis,     GI/Hepatic:       Renal/Genitourinary:     (+) renal disease, type: CRI, Pt does not require dialysis,     Endo:     (+) thyroid problem, hypothyroidism,     Psychiatric/Substance Use:       Infectious Disease:       Malignancy:       Other:            Physical Exam    Airway  airway exam normal           Respiratory Devices and Support         Dental  no notable dental history         Cardiovascular   cardiovascular exam normal          Pulmonary   pulmonary exam normal                OUTSIDE LABS:  CBC: No results found for: WBC, HGB, HCT, PLT  BMP: No results found for: NA, POTASSIUM, CHLORIDE, CO2, BUN, CR, GLC  COAGS: No results found for: PTT, INR, FIBR  POC: No results found for: BGM, HCG, HCGS  HEPATIC: No results found for: ALBUMIN, PROTTOTAL, ALT, AST, GGT, ALKPHOS, BILITOTAL, BILIDIRECT, JEROME  OTHER: No results found for: PH, LACT, A1C, TARA, PHOS, MAG, LIPASE, AMYLASE, TSH, T4, T3, CRP, SED    Anesthesia Plan    ASA Status:  2   NPO Status:  NPO Appropriate    Anesthesia Type: MAC.     - Reason for MAC: straight local not clinically adequate   Induction: Intravenous.   Maintenance: TIVA.        Consents    Anesthesia Plan(s) and associated risks, benefits, and realistic alternatives discussed. Questions answered and patient/representative(s) expressed understanding.    - Discussed:     - Discussed with:  Patient         Postoperative Care    Pain management: Multi-modal analgesia.   PONV prophylaxis: Ondansetron (or other 5HT-3)     Comments:                Baldev Burns MD

## 2022-10-31 NOTE — DISCHARGE INSTRUCTIONS
"[facility]  Home Care Following Anesthesia  For 24 hours after surgery:  Get plenty of rest.  A responsible adult must stay with you for at least 24 hours after you leave the surgery center.  Do not drive or use heavy equipment.  If you have weakness or tingling, don't drive or use heavy equipment until this feeling goes away.   Do not drink alcohol.   Avoid strenuous or risky activities.  Ask for help when climbing stairs.  You may feel lightheaded.  IF so, sit for a few minutes before standing.  Have someone help you get up.   If you have nausea (feel sick to your stomach): Drink only clear liquids such as apple juice, ginger ale, broth or 7-Up.  Rest may also help.  Be sure to drink enough fluids.  Move to a regular diet as you feel able.   You may have a slight fever.  Call the doctor if your fever is over 100 F (37.7 C) (taken under the tongue) or lasts longer than 24 hours.  You may have a dry mouth, a sore throat, muscle aches or trouble sleeping. These should go away after 24 hours.  Do not make important or legal decisions.   It is recommended to avoid smoking.        Today you received a Marcaine or bupivacaine block to numb the nerves near your surgery site.  This is a block using local anesthetic or \"numbing\" medication injected around the nerves to anesthetize or \"numb\" the area supplied by those nerves.  This block is injected into the muscle layer near your surgical site.  The medication may numb the location where you had surgery for 6-18 hours, but may last up to 24 hours.  If your surgical site is an arm or leg you should be careful with your affected limb, since it is possible to injure your limb without being aware of it due to the numbing.  Until full feeling returns, you should guard against bumping or hitting your limb, and avoid extreme hot or cold temperatures on the skin.  As the block wears off, the feeling will return as a tingling or prickly " sensation near your surgical site.  You will experience more discomfort from your incision as the feeling returns.  You may want to take a pain pill (a narcotic or Tylenol if this was prescribed by your surgeon) when you start to experience mild pain before the pain beccomes more severe.  If your pain medications do not control your pain you should notifiy your surgeon.    Tips for taking pain medications  To get the best pain relief possible, remember these points:  Take pain medications as directed, before pain becomes severe.  Pain medication can upset your stomach: taking it with food may help.  Constipation is a common side effect of pain medication. Drink plenty of  fluids.  Eat foods high in fiber. Take a stool softener if recommended by your doctor or pharmacist.  Do not drink alcohol, drive or operate machinery while taking pain medications.  Ask about other ways to control pain, such as with heat, ice or relaxation.    Tylenol/Acetaminophen Consumption  To help encourage the safe use of acetaminophen, the makers of TYLENOL  have lowered the maximum daily dose for single-ingredient Extra Strength TYLENOL  (acetaminophen) products sold in the [place] from 8 pills per day (4,000 mg) to 6 pills per day (3,000 mg). The dosing interval has also changed from 2 pills every 4-6 hours to 2 pills every 6 hours.  If you feel your pain relief is insufficient, you may take Tylenol/Acetaminophen in addition to your narcotic pain medication.   Be careful not to exceed 3,000 mg of Tylenol/Acetaminophen in a 24 hour period from all sources.  If you are taking extra strength Tylenol/acetaminophen (500 mg), the maximum dose is 6 tablets in 24 hours.  If you are taking regular strength acetaminophen (325 mg), the maximum dose is 9 tablets in 24 hours.    Call a doctor for any of the following:  Signs of infection (fever, growing tenderness at the surgery site, a large amount of drainage or bleeding, severe pain, foul-smelling  drainage, redness, swelling).  It has been over 8 to 10 hours since surgery and you are still not able to urinate (pass water).  Headache for over 24 hours.  Numbness, tingling or weakness the day after surgery (if you had spinal anesthesia).  Signs of Covid-19 infection (temperature over 100 degrees, shortness of breath, cough, loss of taste/smell, generalized body aches, persistent headache, chills, sore throat, nausea/vomiting/diarrhea)  Your doctor is:  Dr. Pratik Naranjo, Gynecologic Oncology: [phone]                    Or dial [phone] and ask for the resident on call for:  Gynecologic Oncology  For emergency care, call the:  [facility]:  [phone] (TTY for hearing impaired: [phone])

## 2022-11-07 ENCOUNTER — OFFICE VISIT (OUTPATIENT)
Dept: UROLOGY | Facility: CLINIC | Age: 72
End: 2022-11-07
Payer: MEDICARE

## 2022-11-07 DIAGNOSIS — N32.81 OVERACTIVE BLADDER: Primary | ICD-10-CM

## 2022-11-07 PROCEDURE — 64566 NEUROELTRD STIM POST TIBIAL: CPT

## 2022-11-07 NOTE — PROGRESS NOTES
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 due to significant reaction  Treats with prednisone p.r.n. due to significant reaction       Gramineae Pollens Other (See Comments) and Unknown     Itchy throat, cough, runny nose  Itchy throat, cough, runny nose         Current Outpatient Medications   Medication Sig Dispense Refill     acetaminophen (TYLENOL) 325 MG tablet Take 1-2 tablets (325-650 mg) by mouth every 6 hours as needed for mild pain 50 tablet 0     aspirin (ASA) 81 MG chewable tablet Take 81 mg by mouth       atorvastatin (LIPITOR) 20 MG tablet Take 20 mg by mouth as needed       Azelaic Acid 15 % FOAM        bimatoprost (LATISSE) 0.03 % external opthalmic solution        calcium carbonate 500 mg, elemental, 1250 (500 Ca) MG tablet chewable Take 200-400 mg by mouth       celecoxib (CELEBREX) 200 MG capsule        cetirizine (ZYRTEC) 10 MG tablet Take 10 mg by mouth       estradiol (ESTRACE) 0.1 MG/GM vaginal cream Place 1 g vaginally daily 42.5 g 3     estradiol (ESTRACE) 1 MG tablet Take 1 mg by mouth       guaiFENesin (MUCINEX) 600 MG 12 hr tablet Take 600 mg by mouth       levothyroxine (SYNTHROID/LEVOTHROID) 112 MCG tablet Take 112 mcg by mouth every 48 hours       lisinopril (ZESTRIL) 40 MG tablet Take 40 mg by mouth       mometasone (NASONEX) 50 MCG/ACT nasal spray Spray 2 sprays in nostril as needed       omeprazole (PRILOSEC) 20 MG DR capsule Take 20 mg by mouth       oxyCODONE (ROXICODONE) 5 MG tablet Take 1-2 tablets (5-10 mg) by mouth every 4 hours as needed for  moderate to severe pain 6 tablet 0     solifenacin (VESICARE) 10 MG tablet Take 10 mg by mouth daily       tretinoin (RETIN-A) 0.025 % external cream        valACYclovir (VALTREX) 1000 mg tablet daily as needed.         Social History     Tobacco Use     Smoking status: Never     Smokeless tobacco: Never   Vaping Use     Vaping Use: Never used   Substance Use Topics     Alcohol use: Yes     Alcohol/week: 2.0 standard drinks     Types: 2 Standard drinks or equivalent per week     Drug use: Never       Daisy Jc comes into clinic today at the request of Dr. Pratik Naranjo for PTNS treatment.    This service provided today was under the direct supervision of Dr. Woodard, who was available if needed.    Percutaneous Tibial Nerve Stimulation (PTNS)    Treatment number : 5    Percutaneous tibial nerve stimulation (PTNS) was prescribed for Daisy Jc's Overactive Bladder symptoms of urge incontinence. The needle electrode was inserted into the lower, inner aspect of the right leg. The surface electrode was placed on the inside arch of the foot on the treatment leg. The lead set was connected to the stimulator, and the needle electrode clip was connected to the needle electrode. The stimulator that produces an adjustable electrical pulse that travels to the sacral nerve plexus via the tibial nerve was adjusted to a setting of 2 with good toe curl noted. The voiding diary or patient's symptoms were reviewed prior to the start of treatment. The patient experienced decrease in incontinence episodes since the last treatment. Patient states she has completely stopped taking Vesicare.    Hernandez Downs EMT  11/7/2022  10:04 AM

## 2022-11-15 ENCOUNTER — OFFICE VISIT (OUTPATIENT)
Dept: UROLOGY | Facility: CLINIC | Age: 72
End: 2022-11-15
Payer: MEDICARE

## 2022-11-15 DIAGNOSIS — N32.81 OVERACTIVE BLADDER: Primary | ICD-10-CM

## 2022-11-15 PROCEDURE — 64566 NEUROELTRD STIM POST TIBIAL: CPT

## 2022-11-15 NOTE — PROGRESS NOTES
No chief complaint on file.      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 due to significant reaction  Treats with prednisone p.r.n. due to significant reaction       Gramineae Pollens Other (See Comments) and Unknown     Itchy throat, cough, runny nose  Itchy throat, cough, runny nose         Current Outpatient Medications   Medication Sig Dispense Refill     acetaminophen (TYLENOL) 325 MG tablet Take 1-2 tablets (325-650 mg) by mouth every 6 hours as needed for mild pain 50 tablet 0     aspirin (ASA) 81 MG chewable tablet Take 81 mg by mouth       atorvastatin (LIPITOR) 20 MG tablet Take 20 mg by mouth as needed       Azelaic Acid 15 % FOAM        bimatoprost (LATISSE) 0.03 % external opthalmic solution        calcium carbonate 500 mg, elemental, 1250 (500 Ca) MG tablet chewable Take 200-400 mg by mouth       celecoxib (CELEBREX) 200 MG capsule        cetirizine (ZYRTEC) 10 MG tablet Take 10 mg by mouth       estradiol (ESTRACE) 0.1 MG/GM vaginal cream Place 1 g vaginally daily 42.5 g 3     estradiol (ESTRACE) 1 MG tablet Take 1 mg by mouth       guaiFENesin (MUCINEX) 600 MG 12 hr tablet Take 600 mg by mouth       levothyroxine (SYNTHROID/LEVOTHROID) 112 MCG tablet Take 112 mcg by mouth every 48 hours       lisinopril (ZESTRIL) 40 MG tablet Take 40 mg by mouth       mometasone (NASONEX) 50 MCG/ACT nasal spray Spray 2 sprays in nostril as needed       omeprazole (PRILOSEC) 20 MG DR capsule Take 20 mg by mouth       oxyCODONE (ROXICODONE) 5 MG tablet Take 1-2 tablets (5-10 mg) by mouth every 4 hours as needed for moderate to severe pain 6 tablet 0     solifenacin  (VESICARE) 10 MG tablet Take 10 mg by mouth daily       tretinoin (RETIN-A) 0.025 % external cream        valACYclovir (VALTREX) 1000 mg tablet daily as needed.         Social History     Tobacco Use     Smoking status: Never     Smokeless tobacco: Never   Vaping Use     Vaping Use: Never used   Substance Use Topics     Alcohol use: Yes     Alcohol/week: 2.0 standard drinks     Types: 2 Standard drinks or equivalent per week     Drug use: Never       Daisy Jc comes into clinic today at the request of Dr. Pratik Naranjo for PTNS treatment.    This service provided today was under the direct supervision of Anastasiia Peacock CNP, who was available if needed.    Percutaneous Tibial Nerve Stimulation (PTNS)    Treatment number : 6    Percutaneous tibial nerve stimulation (PTNS) was prescribed for Daisy Jc's Overactive Bladder symptoms of urge incontinence. The needle electrode was inserted into the lower, inner aspect of the right leg. The surface electrode was placed on the inside arch of the foot on the treatment leg. The lead set was connected to the stimulator, and the needle electrode clip was connected to the needle electrode. The stimulator that produces an adjustable electrical pulse that travels to the sacral nerve plexus via the tibial nerve was adjusted to a setting of 5 with good toe curl noted. The voiding diary or patient's symptoms were reviewed prior to the start of treatment. The patient experienced decrease in incontinence episodes since the last treatment.    Hernandez Downs EMT  11/15/2022  12:40 PM

## 2022-11-28 ENCOUNTER — OFFICE VISIT (OUTPATIENT)
Dept: UROLOGY | Facility: CLINIC | Age: 72
End: 2022-11-28
Payer: MEDICARE

## 2022-11-28 DIAGNOSIS — N32.81 OVERACTIVE BLADDER: Primary | ICD-10-CM

## 2022-11-28 PROCEDURE — 64566 NEUROELTRD STIM POST TIBIAL: CPT

## 2022-11-28 NOTE — PROGRESS NOTES
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 due to significant reaction  Treats with prednisone p.r.n. due to significant reaction       Gramineae Pollens Other (See Comments) and Unknown     Itchy throat, cough, runny nose  Itchy throat, cough, runny nose         Current Outpatient Medications   Medication Sig Dispense Refill     acetaminophen (TYLENOL) 325 MG tablet Take 1-2 tablets (325-650 mg) by mouth every 6 hours as needed for mild pain 50 tablet 0     aspirin (ASA) 81 MG chewable tablet Take 81 mg by mouth       atorvastatin (LIPITOR) 20 MG tablet Take 20 mg by mouth as needed       Azelaic Acid 15 % FOAM        bimatoprost (LATISSE) 0.03 % external opthalmic solution        calcium carbonate 500 mg, elemental, 1250 (500 Ca) MG tablet chewable Take 200-400 mg by mouth       celecoxib (CELEBREX) 200 MG capsule        cetirizine (ZYRTEC) 10 MG tablet Take 10 mg by mouth       estradiol (ESTRACE) 0.1 MG/GM vaginal cream Place 1 g vaginally daily 42.5 g 3     estradiol (ESTRACE) 1 MG tablet Take 1 mg by mouth       guaiFENesin (MUCINEX) 600 MG 12 hr tablet Take 600 mg by mouth       levothyroxine (SYNTHROID/LEVOTHROID) 112 MCG tablet Take 112 mcg by mouth every 48 hours       lisinopril (ZESTRIL) 40 MG tablet Take 40 mg by mouth       mometasone (NASONEX) 50 MCG/ACT nasal spray Spray 2 sprays in nostril as needed       omeprazole (PRILOSEC) 20 MG DR capsule Take 20 mg by mouth       oxyCODONE (ROXICODONE) 5 MG tablet Take 1-2 tablets (5-10 mg) by mouth every 4 hours as needed for  moderate to severe pain 6 tablet 0     solifenacin (VESICARE) 10 MG tablet Take 10 mg by mouth daily       tretinoin (RETIN-A) 0.025 % external cream        valACYclovir (VALTREX) 1000 mg tablet daily as needed.         Social History     Tobacco Use     Smoking status: Never     Smokeless tobacco: Never   Vaping Use     Vaping Use: Never used   Substance Use Topics     Alcohol use: Yes     Alcohol/week: 2.0 standard drinks     Types: 2 Standard drinks or equivalent per week     Drug use: Never       Daisy Jc comes into clinic today at the request of Dr. Pratik Naranjo for PTNS treatment.    This service provided today was under the direct supervision of Dr. Woodard, who was available if needed.    Percutaneous Tibial Nerve Stimulation (PTNS)    Treatment number : 7    Percutaneous tibial nerve stimulation (PTNS) was prescribed for Daisy Jc's Overactive Bladder symptoms of urge incontinence. The needle electrode was inserted into the lower, inner aspect of the right leg. The surface electrode was placed on the inside arch of the foot on the treatment leg. The lead set was connected to the stimulator, and the needle electrode clip was connected to the needle electrode. The stimulator that produces an adjustable electrical pulse that travels to the sacral nerve plexus via the tibial nerve was adjusted to a setting of 2 with good toe curl noted. The voiding diary or patient's symptoms were reviewed prior to the start of treatment. The patient experienced no changes since the last treatment.    Hernandez Downs EMT  11/28/2022  10:13 AM

## 2022-12-05 ENCOUNTER — OFFICE VISIT (OUTPATIENT)
Dept: UROLOGY | Facility: CLINIC | Age: 72
End: 2022-12-05
Payer: MEDICARE

## 2022-12-05 DIAGNOSIS — N32.81 OVERACTIVE BLADDER: Primary | ICD-10-CM

## 2022-12-05 PROCEDURE — 64566 NEUROELTRD STIM POST TIBIAL: CPT

## 2022-12-05 NOTE — PROGRESS NOTES
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 due to significant reaction  Treats with prednisone p.r.n. due to significant reaction       Gramineae Pollens Other (See Comments) and Unknown     Itchy throat, cough, runny nose  Itchy throat, cough, runny nose         Current Outpatient Medications   Medication Sig Dispense Refill     acetaminophen (TYLENOL) 325 MG tablet Take 1-2 tablets (325-650 mg) by mouth every 6 hours as needed for mild pain 50 tablet 0     aspirin (ASA) 81 MG chewable tablet Take 81 mg by mouth       atorvastatin (LIPITOR) 20 MG tablet Take 20 mg by mouth as needed       Azelaic Acid 15 % FOAM        bimatoprost (LATISSE) 0.03 % external opthalmic solution        calcium carbonate 500 mg, elemental, 1250 (500 Ca) MG tablet chewable Take 200-400 mg by mouth       celecoxib (CELEBREX) 200 MG capsule        cetirizine (ZYRTEC) 10 MG tablet Take 10 mg by mouth       estradiol (ESTRACE) 0.1 MG/GM vaginal cream Place 1 g vaginally daily 42.5 g 3     estradiol (ESTRACE) 1 MG tablet Take 1 mg by mouth       guaiFENesin (MUCINEX) 600 MG 12 hr tablet Take 600 mg by mouth       levothyroxine (SYNTHROID/LEVOTHROID) 112 MCG tablet Take 112 mcg by mouth every 48 hours       lisinopril (ZESTRIL) 40 MG tablet Take 40 mg by mouth       mometasone (NASONEX) 50 MCG/ACT nasal spray Spray 2 sprays in nostril as needed       omeprazole (PRILOSEC) 20 MG DR capsule Take 20 mg by mouth       oxyCODONE (ROXICODONE) 5 MG tablet Take 1-2 tablets (5-10 mg) by mouth every 4 hours as needed for  moderate to severe pain 6 tablet 0     solifenacin (VESICARE) 10 MG tablet Take 10 mg by mouth daily       tretinoin (RETIN-A) 0.025 % external cream        valACYclovir (VALTREX) 1000 mg tablet daily as needed.         Social History     Tobacco Use     Smoking status: Never     Smokeless tobacco: Never   Vaping Use     Vaping Use: Never used   Substance Use Topics     Alcohol use: Yes     Alcohol/week: 2.0 standard drinks     Types: 2 Standard drinks or equivalent per week     Drug use: Never       Daisy Jc comes into clinic today at the request of Dr. Pratik Naranjo for PTNS treatment.    This service provided today was under the direct supervision of Dr. Woodard, who was available if needed.    Percutaneous Tibial Nerve Stimulation (PTNS)    Treatment number : 8    Percutaneous tibial nerve stimulation (PTNS) was prescribed for Daisy Jc's Overactive Bladder symptoms of urge incontinence. The needle electrode was inserted into the lower, inner aspect of the right leg. The surface electrode was placed on the inside arch of the foot on the treatment leg. The lead set was connected to the stimulator, and the needle electrode clip was connected to the needle electrode. The stimulator that produces an adjustable electrical pulse that travels to the sacral nerve plexus via the tibial nerve was adjusted to a setting of 2 with good toe curl noted. The voiding diary or patient's symptoms were reviewed prior to the start of treatment. The patient experienced no changes since the last treatment.    Hernandez Downs EMT  12/5/2022  9:17 AM

## 2022-12-12 ENCOUNTER — OFFICE VISIT (OUTPATIENT)
Dept: UROLOGY | Facility: CLINIC | Age: 72
End: 2022-12-12
Payer: MEDICARE

## 2022-12-12 DIAGNOSIS — N32.81 OVERACTIVE BLADDER: Primary | ICD-10-CM

## 2022-12-12 PROCEDURE — 64566 NEUROELTRD STIM POST TIBIAL: CPT

## 2022-12-12 NOTE — PROGRESS NOTES
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 due to significant reaction  Treats with prednisone p.r.n. due to significant reaction       Gramineae Pollens Other (See Comments) and Unknown     Itchy throat, cough, runny nose  Itchy throat, cough, runny nose         Current Outpatient Medications   Medication Sig Dispense Refill     acetaminophen (TYLENOL) 325 MG tablet Take 1-2 tablets (325-650 mg) by mouth every 6 hours as needed for mild pain 50 tablet 0     aspirin (ASA) 81 MG chewable tablet Take 81 mg by mouth       atorvastatin (LIPITOR) 20 MG tablet Take 20 mg by mouth as needed       Azelaic Acid 15 % FOAM        bimatoprost (LATISSE) 0.03 % external opthalmic solution        calcium carbonate 500 mg, elemental, 1250 (500 Ca) MG tablet chewable Take 200-400 mg by mouth       celecoxib (CELEBREX) 200 MG capsule        cetirizine (ZYRTEC) 10 MG tablet Take 10 mg by mouth       estradiol (ESTRACE) 0.1 MG/GM vaginal cream Place 1 g vaginally daily 42.5 g 3     estradiol (ESTRACE) 1 MG tablet Take 1 mg by mouth       guaiFENesin (MUCINEX) 600 MG 12 hr tablet Take 600 mg by mouth       levothyroxine (SYNTHROID/LEVOTHROID) 112 MCG tablet Take 112 mcg by mouth every 48 hours       lisinopril (ZESTRIL) 40 MG tablet Take 40 mg by mouth       mometasone (NASONEX) 50 MCG/ACT nasal spray Spray 2 sprays in nostril as needed       omeprazole (PRILOSEC) 20 MG DR capsule Take 20 mg by mouth       oxyCODONE (ROXICODONE) 5 MG tablet Take 1-2 tablets (5-10 mg) by mouth every 4 hours as needed for  moderate to severe pain 6 tablet 0     solifenacin (VESICARE) 10 MG tablet Take 10 mg by mouth daily       tretinoin (RETIN-A) 0.025 % external cream        valACYclovir (VALTREX) 1000 mg tablet daily as needed.         Social History     Tobacco Use     Smoking status: Never     Smokeless tobacco: Never   Vaping Use     Vaping Use: Never used   Substance Use Topics     Alcohol use: Yes     Alcohol/week: 2.0 standard drinks     Types: 2 Standard drinks or equivalent per week     Drug use: Never       Daisy Jc comes into clinic today at the request of Dr. Naranjo for PTNS treatment.    This service provided today was under the direct supervision of Dr. Woodard, who was available if needed.    Percutaneous Tibial Nerve Stimulation (PTNS)    Treatment number : 9    Percutaneous tibial nerve stimulation (PTNS) was prescribed for Daisy Jc's Overactive Bladder symptoms of urge incontinence. The needle electrode was inserted into the lower, inner aspect of the right leg. The surface electrode was placed on the inside arch of the foot on the treatment leg. The lead set was connected to the stimulator, and the needle electrode clip was connected to the needle electrode. The stimulator that produces an adjustable electrical pulse that travels to the sacral nerve plexus via the tibial nerve was adjusted to a setting of 3 with good toe curl noted. The voiding diary or patient's symptoms were reviewed prior to the start of treatment. The patient experienced no changes since the last treatment.    Hernandez Downs EMT  12/12/2022  9:20 AM

## 2022-12-19 ENCOUNTER — OFFICE VISIT (OUTPATIENT)
Dept: UROLOGY | Facility: CLINIC | Age: 72
End: 2022-12-19
Payer: MEDICARE

## 2022-12-19 DIAGNOSIS — N32.81 OVERACTIVE BLADDER: Primary | ICD-10-CM

## 2022-12-19 PROCEDURE — 64566 NEUROELTRD STIM POST TIBIAL: CPT

## 2022-12-19 NOTE — PROGRESS NOTES
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 due to significant reaction  Treats with prednisone p.r.n. due to significant reaction       Gramineae Pollens Other (See Comments) and Unknown     Itchy throat, cough, runny nose  Itchy throat, cough, runny nose         Current Outpatient Medications   Medication Sig Dispense Refill     acetaminophen (TYLENOL) 325 MG tablet Take 1-2 tablets (325-650 mg) by mouth every 6 hours as needed for mild pain 50 tablet 0     aspirin (ASA) 81 MG chewable tablet Take 81 mg by mouth       atorvastatin (LIPITOR) 20 MG tablet Take 20 mg by mouth as needed       Azelaic Acid 15 % FOAM        bimatoprost (LATISSE) 0.03 % external opthalmic solution        calcium carbonate 500 mg, elemental, 1250 (500 Ca) MG tablet chewable Take 200-400 mg by mouth       celecoxib (CELEBREX) 200 MG capsule        cetirizine (ZYRTEC) 10 MG tablet Take 10 mg by mouth       estradiol (ESTRACE) 0.1 MG/GM vaginal cream Place 1 g vaginally daily 42.5 g 3     estradiol (ESTRACE) 1 MG tablet Take 1 mg by mouth       guaiFENesin (MUCINEX) 600 MG 12 hr tablet Take 600 mg by mouth       levothyroxine (SYNTHROID/LEVOTHROID) 112 MCG tablet Take 112 mcg by mouth every 48 hours       lisinopril (ZESTRIL) 40 MG tablet Take 40 mg by mouth       mometasone (NASONEX) 50 MCG/ACT nasal spray Spray 2 sprays in nostril as needed       omeprazole (PRILOSEC) 20 MG DR capsule Take 20 mg by mouth       oxyCODONE (ROXICODONE) 5 MG tablet Take 1-2 tablets (5-10 mg) by mouth every 4 hours as needed for  moderate to severe pain 6 tablet 0     solifenacin (VESICARE) 10 MG tablet Take 10 mg by mouth daily       tretinoin (RETIN-A) 0.025 % external cream        valACYclovir (VALTREX) 1000 mg tablet daily as needed.         Social History     Tobacco Use     Smoking status: Never     Smokeless tobacco: Never   Vaping Use     Vaping Use: Never used   Substance Use Topics     Alcohol use: Yes     Alcohol/week: 2.0 standard drinks     Types: 2 Standard drinks or equivalent per week     Drug use: Never       Daisy Jc comes into clinic today at the request of Dr. Pratik Naranjo for PTNS treatment.    This service provided today was under the direct supervision of Anastasiia Peacock CNP, who was available if needed.    Percutaneous Tibial Nerve Stimulation (PTNS)    Treatment number : 10    Percutaneous tibial nerve stimulation (PTNS) was prescribed for Daisy Jc's Overactive Bladder symptoms of urge incontinence. The needle electrode was inserted into the lower, inner aspect of the right leg. The surface electrode was placed on the inside arch of the foot on the treatment leg. The lead set was connected to the stimulator, and the needle electrode clip was connected to the needle electrode. The stimulator that produces an adjustable electrical pulse that travels to the sacral nerve plexus via the tibial nerve was adjusted to a setting of 6 with good toe curl noted. The voiding diary or patient's symptoms were reviewed prior to the start of treatment. The patient experienced no changes since the last treatment.    Hernandez Downs EMT  12/19/2022   8:58 AM

## 2022-12-27 ENCOUNTER — OFFICE VISIT (OUTPATIENT)
Dept: UROLOGY | Facility: CLINIC | Age: 72
End: 2022-12-27
Payer: MEDICARE

## 2022-12-27 DIAGNOSIS — N32.81 OVERACTIVE BLADDER: Primary | ICD-10-CM

## 2022-12-27 PROCEDURE — 64566 NEUROELTRD STIM POST TIBIAL: CPT

## 2022-12-27 NOTE — PROGRESS NOTES
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 due to significant reaction  Treats with prednisone p.r.n. due to significant reaction       Gramineae Pollens Other (See Comments) and Unknown     Itchy throat, cough, runny nose  Itchy throat, cough, runny nose         Current Outpatient Medications   Medication Sig Dispense Refill     acetaminophen (TYLENOL) 325 MG tablet Take 1-2 tablets (325-650 mg) by mouth every 6 hours as needed for mild pain 50 tablet 0     aspirin (ASA) 81 MG chewable tablet Take 81 mg by mouth       atorvastatin (LIPITOR) 20 MG tablet Take 20 mg by mouth as needed       Azelaic Acid 15 % FOAM        bimatoprost (LATISSE) 0.03 % external opthalmic solution        calcium carbonate 500 mg, elemental, 1250 (500 Ca) MG tablet chewable Take 200-400 mg by mouth       celecoxib (CELEBREX) 200 MG capsule        cetirizine (ZYRTEC) 10 MG tablet Take 10 mg by mouth       estradiol (ESTRACE) 0.1 MG/GM vaginal cream Place 1 g vaginally daily 42.5 g 3     estradiol (ESTRACE) 1 MG tablet Take 1 mg by mouth       guaiFENesin (MUCINEX) 600 MG 12 hr tablet Take 600 mg by mouth       levothyroxine (SYNTHROID/LEVOTHROID) 112 MCG tablet Take 112 mcg by mouth every 48 hours       lisinopril (ZESTRIL) 40 MG tablet Take 40 mg by mouth       mometasone (NASONEX) 50 MCG/ACT nasal spray Spray 2 sprays in nostril as needed       omeprazole (PRILOSEC) 20 MG DR capsule Take 20 mg by mouth       oxyCODONE (ROXICODONE) 5 MG tablet Take 1-2 tablets (5-10 mg) by mouth every 4 hours as needed for  moderate to severe pain 6 tablet 0     solifenacin (VESICARE) 10 MG tablet Take 10 mg by mouth daily       tretinoin (RETIN-A) 0.025 % external cream        valACYclovir (VALTREX) 1000 mg tablet daily as needed.         Social History     Tobacco Use     Smoking status: Never     Smokeless tobacco: Never   Vaping Use     Vaping Use: Never used   Substance Use Topics     Alcohol use: Yes     Alcohol/week: 2.0 standard drinks     Types: 2 Standard drinks or equivalent per week     Drug use: Never       Daisy Jc comes into clinic today at the request of Dr. Pratik Naranjo for PTNS treatment.    This service provided today was under the direct supervision of Dr. Reese, who was available if needed.    Percutaneous Tibial Nerve Stimulation (PTNS)    Treatment number : 11    Percutaneous tibial nerve stimulation (PTNS) was prescribed for Daisy Jc's Overactive Bladder symptoms of urge incontinence. The needle electrode was inserted into the lower, inner aspect of the right leg. The surface electrode was placed on the inside arch of the foot on the treatment leg. The lead set was connected to the stimulator, and the needle electrode clip was connected to the needle electrode. The stimulator that produces an adjustable electrical pulse that travels to the sacral nerve plexus via the tibial nerve was adjusted to a setting of 7 with good toe curl noted. The voiding diary or patient's symptoms were reviewed prior to the start of treatment. The patient experienced no changes since the last treatment.    Hernandez Downs EMT  12/27/2022  9:12 AM

## 2023-01-16 ENCOUNTER — OFFICE VISIT (OUTPATIENT)
Dept: UROLOGY | Facility: CLINIC | Age: 73
End: 2023-01-16
Payer: MEDICARE

## 2023-01-16 DIAGNOSIS — N32.81 OVERACTIVE BLADDER: Primary | ICD-10-CM

## 2023-01-16 PROCEDURE — 64566 NEUROELTRD STIM POST TIBIAL: CPT

## 2023-01-16 NOTE — PROGRESS NOTES
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 due to significant reaction  Treats with prednisone p.r.n. due to significant reaction       Gramineae Pollens Other (See Comments) and Unknown     Itchy throat, cough, runny nose  Itchy throat, cough, runny nose         Current Outpatient Medications   Medication Sig Dispense Refill     acetaminophen (TYLENOL) 325 MG tablet Take 1-2 tablets (325-650 mg) by mouth every 6 hours as needed for mild pain 50 tablet 0     aspirin (ASA) 81 MG chewable tablet Take 81 mg by mouth       atorvastatin (LIPITOR) 20 MG tablet Take 20 mg by mouth as needed       Azelaic Acid 15 % FOAM        bimatoprost (LATISSE) 0.03 % external opthalmic solution        calcium carbonate 500 mg, elemental, 1250 (500 Ca) MG tablet chewable Take 200-400 mg by mouth       celecoxib (CELEBREX) 200 MG capsule        cetirizine (ZYRTEC) 10 MG tablet Take 10 mg by mouth       estradiol (ESTRACE) 0.1 MG/GM vaginal cream Place 1 g vaginally daily 42.5 g 3     estradiol (ESTRACE) 1 MG tablet Take 1 mg by mouth       guaiFENesin (MUCINEX) 600 MG 12 hr tablet Take 600 mg by mouth       levothyroxine (SYNTHROID/LEVOTHROID) 112 MCG tablet Take 112 mcg by mouth every 48 hours       lisinopril (ZESTRIL) 40 MG tablet Take 40 mg by mouth       mometasone (NASONEX) 50 MCG/ACT nasal spray Spray 2 sprays in nostril as needed       omeprazole (PRILOSEC) 20 MG DR capsule Take 20 mg by mouth       oxyCODONE (ROXICODONE) 5 MG tablet Take 1-2 tablets (5-10 mg) by mouth every 4 hours as needed for  moderate to severe pain 6 tablet 0     solifenacin (VESICARE) 10 MG tablet Take 10 mg by mouth daily       tretinoin (RETIN-A) 0.025 % external cream        valACYclovir (VALTREX) 1000 mg tablet daily as needed.         Social History     Tobacco Use     Smoking status: Never     Smokeless tobacco: Never   Vaping Use     Vaping Use: Never used   Substance Use Topics     Alcohol use: Yes     Alcohol/week: 2.0 standard drinks     Types: 2 Standard drinks or equivalent per week     Drug use: Never       Daisy Jc comes into clinic today at the request of Dr. Naranjo for PTNS treatment.    This service provided today was under the direct supervision of Dr. Woodard, who was available if needed.    Percutaneous Tibial Nerve Stimulation (PTNS)    Treatment number : Maintenance    Percutaneous tibial nerve stimulation (PTNS) was prescribed for Daisy Jc's Overactive Bladder symptoms of urge incontinence. The needle electrode was inserted into the lower, inner aspect of the right leg. The surface electrode was placed on the inside arch of the foot on the treatment leg. The lead set was connected to the stimulator, and the needle electrode clip was connected to the needle electrode. The stimulator that produces an adjustable electrical pulse that travels to the sacral nerve plexus via the tibial nerve was adjusted to a setting of 2 with good toe curl noted. The voiding diary or patient's symptoms were reviewed prior to the start of treatment. The patient experienced no changes since the last treatment.    Patient requested refill of estradiol cream. Sent message to Dr. Naranjo's RN, Amanda Jo, to refill medication and call patient.    Hernandez Downs EMT  1/16/2023  9:28 AM

## 2023-01-20 DIAGNOSIS — N95.2 VAGINAL ATROPHY: ICD-10-CM

## 2023-01-20 RX ORDER — ESTRADIOL 0.1 MG/G
1 CREAM VAGINAL DAILY
Qty: 42.5 G | Refills: 3 | Status: SHIPPED | OUTPATIENT
Start: 2023-01-20

## 2023-02-13 ENCOUNTER — OFFICE VISIT (OUTPATIENT)
Dept: UROLOGY | Facility: CLINIC | Age: 73
End: 2023-02-13
Payer: MEDICARE

## 2023-02-13 DIAGNOSIS — N32.81 OVERACTIVE BLADDER: Primary | ICD-10-CM

## 2023-02-13 DIAGNOSIS — N39.46 MIXED INCONTINENCE URGE AND STRESS: ICD-10-CM

## 2023-02-13 PROCEDURE — 64566 NEUROELTRD STIM POST TIBIAL: CPT

## 2023-02-13 NOTE — PROGRESS NOTES
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 due to significant reaction  Treats with prednisone p.r.n. due to significant reaction       Gramineae Pollens Other (See Comments) and Unknown     Itchy throat, cough, runny nose  Itchy throat, cough, runny nose         Current Outpatient Medications   Medication Sig Dispense Refill     acetaminophen (TYLENOL) 325 MG tablet Take 1-2 tablets (325-650 mg) by mouth every 6 hours as needed for mild pain 50 tablet 0     aspirin (ASA) 81 MG chewable tablet Take 81 mg by mouth       atorvastatin (LIPITOR) 20 MG tablet Take 20 mg by mouth as needed       Azelaic Acid 15 % FOAM        bimatoprost (LATISSE) 0.03 % external opthalmic solution        calcium carbonate 500 mg, elemental, 1250 (500 Ca) MG tablet chewable Take 200-400 mg by mouth       celecoxib (CELEBREX) 200 MG capsule        cetirizine (ZYRTEC) 10 MG tablet Take 10 mg by mouth       estradiol (ESTRACE) 0.1 MG/GM vaginal cream Place 1 g vaginally daily 42.5 g 3     estradiol (ESTRACE) 1 MG tablet Take 1 mg by mouth       guaiFENesin (MUCINEX) 600 MG 12 hr tablet Take 600 mg by mouth       levothyroxine (SYNTHROID/LEVOTHROID) 112 MCG tablet Take 112 mcg by mouth every 48 hours       lisinopril (ZESTRIL) 40 MG tablet Take 40 mg by mouth       mometasone (NASONEX) 50 MCG/ACT nasal spray Spray 2 sprays in nostril as needed       omeprazole (PRILOSEC) 20 MG DR capsule Take 20 mg by mouth       oxyCODONE (ROXICODONE) 5 MG tablet Take 1-2 tablets (5-10 mg) by mouth every 4  hours as needed for moderate to severe pain 6 tablet 0     solifenacin (VESICARE) 10 MG tablet Take 10 mg by mouth daily       tretinoin (RETIN-A) 0.025 % external cream        valACYclovir (VALTREX) 1000 mg tablet daily as needed.         Social History     Tobacco Use     Smoking status: Never     Smokeless tobacco: Never   Vaping Use     Vaping Use: Never used   Substance Use Topics     Alcohol use: Yes     Alcohol/week: 2.0 standard drinks     Types: 2 Standard drinks or equivalent per week     Drug use: Never       Daisy Jc comes into clinic today at the request of Dr. Pratik Naranjo for PTNS treatment.    This service provided today was under the direct supervision of Dr. Bryan Woodard, who was available if needed.    Percutaneous Tibial Nerve Stimulation (PTNS)    Treatment number : monthly maintenance    Percutaneous tibial nerve stimulation (PTNS) was prescribed for Daisy Jc's Overactive Bladder symptoms of urge incontinence. The needle electrode was inserted into the lower, inner aspect of the right leg. The surface electrode was placed on the inside arch of the foot on the treatment leg. The lead set was connected to the stimulator, and the needle electrode clip was connected to the needle electrode. The stimulator that produces an adjustable electrical pulse that travels to the sacral nerve plexus via the tibial nerve was adjusted to a setting of 8 with good toe curl noted. The voiding diary or patient's symptoms were reviewed prior to the start of treatment. The patient experienced no changes since the last treatment.    Additional notes: Follow up as planned    Joelle Pires CMA  2/13/2023  10:10 AM

## 2023-03-22 ENCOUNTER — TELEPHONE (OUTPATIENT)
Dept: UROLOGY | Facility: CLINIC | Age: 73
End: 2023-03-22
Payer: MEDICARE

## 2023-03-22 NOTE — TELEPHONE ENCOUNTER
[facility] Call Center    Phone Message    May a detailed message be left on voicemail: yes     Reason for Call: Other: .   Pt calling regarding upcoming appt for PTNS. Pt is wanting a earlier appt the same day if possible. Please call pt      Action Taken: Message routed to:  Other: [facility]    Travel Screening: Not Applicable

## 2023-04-11 ENCOUNTER — OFFICE VISIT (OUTPATIENT)
Dept: UROLOGY | Facility: CLINIC | Age: 73
End: 2023-04-11
Payer: MEDICARE

## 2023-04-11 DIAGNOSIS — N32.81 OVERACTIVE BLADDER: Primary | ICD-10-CM

## 2023-04-11 PROCEDURE — 64566 NEUROELTRD STIM POST TIBIAL: CPT

## 2023-04-11 NOTE — PROGRESS NOTES
Chief Complaint   Patient presents with     Allied Health Visit     PTNS - Monthly maintenance      Sara Villafana, CMA

## 2023-04-11 NOTE — PATIENT INSTRUCTIONS
Follow up in one month for next PTNS treatment.    It was a pleasure meeting with you today.  Thank you for allowing me and my team the privilege of caring for you today.  YOU are the reason we are here, and I truly hope we provided you with the excellent service you deserve.  Please let us know if there is anything else we can do for you so that we can be sure you are leaving completely satisfied with your care experience.      Sara Villafana, CMA

## 2023-04-11 NOTE — NURSING NOTE
Chief Complaint   Patient presents with     Allied Health Visit       PTNS - monthly maintenance              Patient Active Problem List   Diagnosis     Acquired hypothyroidism     Cystocele with rectocele     Colon polyp     Osteoarthrosis     CKD (chronic kidney disease) stage 3, GFR 30-59 ml/min (H)     Raynaud's disease     Mixed incontinence     Injury to perineum, sequela               Allergies   Allergen Reactions     Adhesive Tape Rash     Extract Of Poison Ivy Itching       Treats with prednisone p.r.n. due to significant reaction  Treats with prednisone p.r.n. due to significant reaction        Gramineae Pollens Other (See Comments) and Unknown       Itchy throat, cough, runny nose  Itchy throat, cough, runny nose            Current Outpatient Prescriptions          Current Outpatient Medications   Medication Sig Dispense Refill     acetaminophen (TYLENOL) 325 MG tablet Take 1-2 tablets (325-650 mg) by mouth every 6 hours as needed for mild pain 50 tablet 0     aspirin (ASA) 81 MG chewable tablet Take 81 mg by mouth         atorvastatin (LIPITOR) 20 MG tablet Take 20 mg by mouth as needed         Azelaic Acid 15 % FOAM           bimatoprost (LATISSE) 0.03 % external opthalmic solution           calcium carbonate 500 mg, elemental, 1250 (500 Ca) MG tablet chewable Take 200-400 mg by mouth         celecoxib (CELEBREX) 200 MG capsule           cetirizine (ZYRTEC) 10 MG tablet Take 10 mg by mouth         estradiol (ESTRACE) 0.1 MG/GM vaginal cream Place 1 g vaginally daily 42.5 g 3     estradiol (ESTRACE) 1 MG tablet Take 1 mg by mouth         guaiFENesin (MUCINEX) 600 MG 12 hr tablet Take 600 mg by mouth         levothyroxine (SYNTHROID/LEVOTHROID) 112 MCG tablet Take 112 mcg by mouth every 48 hours         lisinopril (ZESTRIL) 40 MG tablet Take 40 mg by mouth         mometasone (NASONEX) 50 MCG/ACT nasal spray Spray 2 sprays in nostril as needed         omeprazole (PRILOSEC) 20 MG DR capsule Take 20 mg by  mouth         oxyCODONE (ROXICODONE) 5 MG tablet Take 1-2 tablets (5-10 mg) by mouth every 4 hours as needed for moderate to severe pain 6 tablet 0     solifenacin (VESICARE) 10 MG tablet Take 10 mg by mouth daily         tretinoin (RETIN-A) 0.025 % external cream           valACYclovir (VALTREX) 1000 mg tablet daily as needed.                Social History            Tobacco Use     Smoking status: Never     Smokeless tobacco: Never   Vaping Use     Vaping Use: Never used   Substance Use Topics     Alcohol use: Yes       Alcohol/week: 2.0 standard drinks       Types: 2 Standard drinks or equivalent per week     Drug use: Never         Daisy Jc comes into clinic today at the request of Dr. Pratik Naranjo for PTNS treatment.     This service provided today was under the direct supervision of Leonora Knight PA-C, who was available if needed.     Percutaneous Tibial Nerve Stimulation (PTNS)     Treatment number : monthly maintenance     Percutaneous tibial nerve stimulation (PTNS) was prescribed for Daisy Jc's Overactive Bladder symptoms of urge incontinence. The needle electrode was inserted into the lower, inner aspect of the right leg. The surface electrode was placed on the inside arch of the foot on the treatment leg. The lead set was connected to the stimulator, and the needle electrode clip was connected to the needle electrode. The stimulator that produces an adjustable electrical pulse that travels to the sacral nerve plexus via the tibial nerve was adjusted to a setting of 4 with good toe curl noted. The voiding diary or patient's symptoms were reviewed prior to the start of treatment. The patient experienced no changes since the last treatment.     Additional notes: Follow up as planned      Sara Villafana, [facility]

## 2023-04-27 ENCOUNTER — TELEPHONE (OUTPATIENT)
Dept: UROLOGY | Facility: CLINIC | Age: 73
End: 2023-04-27
Payer: MEDICARE

## 2023-04-27 NOTE — TELEPHONE ENCOUNTER
[facility] Call Center    Phone Message    May a detailed message be left on voicemail: yes     Reason for Call: Other: Patient calling to reschedule PTNS, Please reach out. Thank you     Action Taken: Message routed to:  [facility]    Travel Screening: Not Applicable

## 2023-05-02 ENCOUNTER — OFFICE VISIT (OUTPATIENT)
Dept: UROLOGY | Facility: CLINIC | Age: 73
End: 2023-05-02
Payer: MEDICARE

## 2023-05-02 DIAGNOSIS — N32.81 OVERACTIVE BLADDER: Primary | ICD-10-CM

## 2023-05-02 PROCEDURE — 99207 PR NO CHARGE NURSE ONLY: CPT

## 2023-05-02 PROCEDURE — 64566 NEUROELTRD STIM POST TIBIAL: CPT

## 2023-05-02 NOTE — PATIENT INSTRUCTIONS
Follow-up for next monthly treatment.    It was a pleasure meeting with you today.  Thank you for allowing me and my team the privilege of caring for you today.  YOU are the reason we are here, and I truly hope we provided you with the excellent service you deserve.  Please let us know if there is anything else we can do for you so that we can be sure you are leaving completely satisfied with your care experience.

## 2023-05-02 NOTE — PROGRESS NOTES
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 due to significant reaction  Treats with prednisone p.r.n. due to significant reaction       Gramineae Pollens Other (See Comments) and Unknown     Itchy throat, cough, runny nose  Itchy throat, cough, runny nose         Current Outpatient Medications   Medication Sig Dispense Refill     acetaminophen (TYLENOL) 325 MG tablet Take 1-2 tablets (325-650 mg) by mouth every 6 hours as needed for mild pain 50 tablet 0     aspirin (ASA) 81 MG chewable tablet Take 81 mg by mouth       atorvastatin (LIPITOR) 20 MG tablet Take 20 mg by mouth as needed       Azelaic Acid 15 % FOAM        bimatoprost (LATISSE) 0.03 % external opthalmic solution        calcium carbonate 500 mg, elemental, 1250 (500 Ca) MG tablet chewable Take 200-400 mg by mouth       celecoxib (CELEBREX) 200 MG capsule        cetirizine (ZYRTEC) 10 MG tablet Take 10 mg by mouth       estradiol (ESTRACE) 0.1 MG/GM vaginal cream Place 1 g vaginally daily 42.5 g 3     estradiol (ESTRACE) 1 MG tablet Take 1 mg by mouth       guaiFENesin (MUCINEX) 600 MG 12 hr tablet Take 600 mg by mouth       levothyroxine (SYNTHROID/LEVOTHROID) 112 MCG tablet Take 112 mcg by mouth every 48 hours       lisinopril (ZESTRIL) 40 MG tablet Take 40 mg by mouth       mometasone (NASONEX) 50 MCG/ACT nasal spray Spray 2 sprays in nostril as needed       omeprazole (PRILOSEC) 20 MG DR capsule Take 20 mg by mouth       oxyCODONE (ROXICODONE) 5 MG tablet Take 1-2 tablets (5-10 mg) by mouth every 4 hours as needed for moderate  to severe pain 6 tablet 0     solifenacin (VESICARE) 10 MG tablet Take 10 mg by mouth daily       tretinoin (RETIN-A) 0.025 % external cream        valACYclovir (VALTREX) 1000 mg tablet daily as needed.         Social History     Tobacco Use     Smoking status: Never     Smokeless tobacco: Never   Vaping Use     Vaping status: Never Used   Substance Use Topics     Alcohol use: Yes     Alcohol/week: 2.0 standard drinks of alcohol     Types: 2 Standard drinks or equivalent per week     Drug use: Never       Daisy Jc comes into clinic today at the request of Dr. Naranjo for PTNS treatment.    This service provided today was under the direct supervision of Dr. Reese, who was available if needed.    Percutaneous Tibial Nerve Stimulation (PTNS)    Treatment number : monthly maintenance    Percutaneous tibial nerve stimulation (PTNS) was prescribed for Daisy Jc's Overactive Bladder symptoms of urge incontinence. The needle electrode was inserted into the lower, inner aspect of the right leg. The surface electrode was placed on the inside arch of the foot on the treatment leg. The lead set was connected to the stimulator, and the needle electrode clip was connected to the needle electrode. The stimulator that produces an adjustable electrical pulse that travels to the sacral nerve plexus via the tibial nerve was adjusted to a setting of 7 with good toe curl noted. The voiding diary or patient's symptoms were reviewed prior to the start of treatment. The patient experienced no changes since the last treatment.    Mirtha García  5/2/2023  9:39 AM

## 2023-05-25 ENCOUNTER — TELEPHONE (OUTPATIENT)
Dept: UROLOGY | Facility: CLINIC | Age: 73
End: 2023-05-25
Payer: MEDICARE

## 2023-05-25 NOTE — TELEPHONE ENCOUNTER
[facility] Call Center    Phone Message    May a detailed message be left on voicemail: yes     Reason for Call: Pt has nurse appt 6/6 for PTNS and wondering if she can reschedule for 6/7 to save a trip to the [facility]. Please call pt to reschedule Thank you    Action Taken: Message routed to:  [facility]    Travel Screening: Not Applicable

## 2023-06-06 ENCOUNTER — OFFICE VISIT (OUTPATIENT)
Dept: UROLOGY | Facility: CLINIC | Age: 73
End: 2023-06-06
Payer: MEDICARE

## 2023-06-06 DIAGNOSIS — N32.81 OVERACTIVE BLADDER: Primary | ICD-10-CM

## 2023-06-06 PROCEDURE — 64566 NEUROELTRD STIM POST TIBIAL: CPT

## 2023-06-06 NOTE — PROGRESS NOTES
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 due to significant reaction  Treats with prednisone p.r.n. due to significant reaction       Gramineae Pollens Other (See Comments) and Unknown     Itchy throat, cough, runny nose  Itchy throat, cough, runny nose         Current Outpatient Medications   Medication Sig Dispense Refill     acetaminophen (TYLENOL) 325 MG tablet Take 1-2 tablets (325-650 mg) by mouth every 6 hours as needed for mild pain 50 tablet 0     aspirin (ASA) 81 MG chewable tablet Take 81 mg by mouth       atorvastatin (LIPITOR) 20 MG tablet Take 20 mg by mouth as needed       Azelaic Acid 15 % FOAM        bimatoprost (LATISSE) 0.03 % external opthalmic solution        calcium carbonate 500 mg, elemental, 1250 (500 Ca) MG tablet chewable Take 200-400 mg by mouth       celecoxib (CELEBREX) 200 MG capsule        cetirizine (ZYRTEC) 10 MG tablet Take 10 mg by mouth       estradiol (ESTRACE) 0.1 MG/GM vaginal cream Place 1 g vaginally daily 42.5 g 3     estradiol (ESTRACE) 1 MG tablet Take 1 mg by mouth       guaiFENesin (MUCINEX) 600 MG 12 hr tablet Take 600 mg by mouth       levothyroxine (SYNTHROID/LEVOTHROID) 112 MCG tablet Take 112 mcg by mouth every 48 hours       lisinopril (ZESTRIL) 40 MG tablet Take 40 mg by mouth       mometasone (NASONEX) 50 MCG/ACT nasal spray Spray 2 sprays in nostril as needed       omeprazole (PRILOSEC) 20 MG DR capsule Take 20 mg by mouth       oxyCODONE (ROXICODONE) 5 MG tablet Take 1-2 tablets (5-10 mg) by mouth every 4 hours as needed for  moderate to severe pain 6 tablet 0     solifenacin (VESICARE) 10 MG tablet Take 10 mg by mouth daily       tretinoin (RETIN-A) 0.025 % external cream        valACYclovir (VALTREX) 1000 mg tablet daily as needed.         Social History     Tobacco Use     Smoking status: Never     Smokeless tobacco: Never   Vaping Use     Vaping status: Never Used   Substance Use Topics     Alcohol use: Yes     Alcohol/week: 2.0 standard drinks of alcohol     Types: 2 Standard drinks or equivalent per week     Drug use: Never       Daisy Jc comes into clinic today at the request of Dr. Naranjo for PTNS treatment.    This service provided today was under the direct supervision of Dr. Reese, who was available if needed.    Percutaneous Tibial Nerve Stimulation (PTNS)    Treatment number : monthly maintenance    Percutaneous tibial nerve stimulation (PTNS) was prescribed for Daisy Jc's Overactive Bladder symptoms of urge incontinence. The needle electrode was inserted into the lower, inner aspect of the right leg. The surface electrode was placed on the inside arch of the foot on the treatment leg. The lead set was connected to the stimulator, and the needle electrode clip was connected to the needle electrode. The stimulator that produces an adjustable electrical pulse that travels to the sacral nerve plexus via the tibial nerve was adjusted to a setting of 5 with good toe curl noted. The voiding diary or patient's symptoms were reviewed prior to the start of treatment. The patient experienced no changes since the last treatment.    Additional notes: pt expressed that based off of her 90% relief in symptoms she will most likely have this be her last treatment. Pt expressed she will reach out to Dr. Naranjo to inform him of this decision.    Mirtha García  6/6/2023  8:38 AM

## 2024-08-29 ENCOUNTER — OFFICE VISIT (OUTPATIENT)
Dept: UROLOGY | Facility: CLINIC | Age: 74
End: 2024-08-29
Attending: OBSTETRICS & GYNECOLOGY
Payer: MEDICARE

## 2024-08-29 VITALS
BODY MASS INDEX: 25.63 KG/M2 | HEART RATE: 80 BPM | DIASTOLIC BLOOD PRESSURE: 83 MMHG | SYSTOLIC BLOOD PRESSURE: 135 MMHG | WEIGHT: 154 LBS

## 2024-08-29 DIAGNOSIS — N39.46 MIXED INCONTINENCE URGE AND STRESS: Primary | ICD-10-CM

## 2024-08-29 DIAGNOSIS — N39.41 URGE INCONTINENCE: ICD-10-CM

## 2024-08-29 PROCEDURE — 99214 OFFICE O/P EST MOD 30 MIN: CPT | Performed by: OBSTETRICS & GYNECOLOGY

## 2024-08-29 PROCEDURE — G0463 HOSPITAL OUTPT CLINIC VISIT: HCPCS | Performed by: OBSTETRICS & GYNECOLOGY

## 2024-08-29 RX ORDER — IPRATROPIUM BROMIDE 21 UG/1
SPRAY, METERED NASAL
COMMUNITY

## 2024-08-29 RX ORDER — MIRABEGRON 25 MG/1
25 TABLET, FILM COATED, EXTENDED RELEASE ORAL DAILY
Qty: 90 TABLET | Refills: 1 | Status: SHIPPED | OUTPATIENT
Start: 2024-08-29 | End: 2024-08-30

## 2024-08-29 NOTE — PROGRESS NOTES
August 29, 2024    Return visit    Patient returns today for F/U after PTNS. Dr. Helton reports that she initally had great improvement with PTNS and had graduated from the 12 weekly treatments to 1/month. Unfortunately she had to stop due to travel and has noted that her urgency and urge incontinence has returned. She reports that she does see improvement with Vesicare, 10mg, but that is causes dry eyes and dry mouth and is not covered by her insurance. She is still using the estrace cream twice/week. She wants to discuss treatment options.    /83   Pulse 80   Wt 69.9 kg (154 lb)   BMI 25.63 kg/m    She is comfortable, in no distress, non-labored breathing.    A/P: 74 year old F with urgency and urge incontinence    We discussed the diagnosis and treatment options. Pt has urgency and urge incontinence. Treatment options to include:  1) observation with f/u in 6 -12 months  2) pelvic floor physical therapy and bladder training  3) medications with either anti-cholinergics or a beta agonist  4) Botox injections of the  bladder  5) Neuromodulation with either PTNS or SNM    After this discussion, Danie Reynolds opted for a beta agonist. Will start Myrbetriq, 25mg every day    I spent a total of 30 minutes with  Dr. Helton  on the date of the encounter in chart review, face to face patient visit, review of tests, documentation and/or discussion with other providers about the issues documented above.    Ju Naranjo MD  Professor, OB/GYN  Urogynecologist    CC  Patient Care Team:  Brittnee Pacheco MD as PCP - General (Family Medicine)  Ju Naranjo MD as MD (OB/Gyn)  JU NARANJO

## 2024-08-29 NOTE — LETTER
8/29/2024       RE: Daisy Jc  [address]  [facility] [place] [facility] [address]     Dear Colleague,    Thank you for referring your patient, Daisy Jc, to the [facility] [place] at [facility]. Please see a copy of my visit note below.    August 29, 2024    Return visit    Patient returns today for F/U after PTNS. Dr. Helton reports that she initally had great improvement with PTNS and had graduated from the 12 weekly treatments to 1/month. Unfortunately she had to stop due to travel and has noted that her urgency and urge incontinence has returned. She reports that she does see improvement with Vesicare, 10mg, but that is causes dry eyes and dry mouth and is not covered by her insurance. She is still using the estrace cream twice/week. She wants to discuss treatment options.    /83   Pulse 80   Wt 69.9 kg (154 lb)   BMI 25.63 kg/m    She is comfortable, in no distress, non-labored breathing.    A/P: 74 year old F with urgency and urge incontinence    We discussed the diagnosis and treatment options. Pt has urgency and urge incontinence. Treatment options to include:  1) observation with f/u in 6 -12 months  2) pelvic floor physical therapy and bladder training  3) medications with either anti-cholinergics or a beta agonist  4) Botox injections of the  bladder  5) Neuromodulation with either PTNS or SNM    After this discussion, Danie Reynolds opted for a beta agonist. Will start Myrbetriq, 25mg every day    I spent a total of 30 minutes with  Dr. Helton  on the date of the encounter in chart review, face to face patient visit, review of tests, documentation and/or discussion with other providers about the issues documented above.    Ju Crump MD  Professor, OB/GYN  Urogynecologist    CC  Patient Care Team:  Brittnee Pacheco MD as PCP - General (Family Medicine)  Ju Crump MD as MD (OB/Gyn)  JU CRUMP  R      Again, thank you for allowing me to participate in the care of your patient.      Sincerely,    Pratik Naranjo MD

## 2024-08-30 ENCOUNTER — TELEPHONE (OUTPATIENT)
Dept: UROLOGY | Facility: CLINIC | Age: 74
End: 2024-08-30
Payer: COMMERCIAL

## 2024-08-30 ENCOUNTER — TELEPHONE (OUTPATIENT)
Dept: UROLOGY | Facility: CLINIC | Age: 74
End: 2024-08-30

## 2024-08-30 DIAGNOSIS — N39.41 URGE INCONTINENCE: ICD-10-CM

## 2024-08-30 RX ORDER — MIRABEGRON 25 MG/1
25 TABLET, FILM COATED, EXTENDED RELEASE ORAL DAILY
Qty: 90 TABLET | Refills: 1 | Status: SHIPPED | OUTPATIENT
Start: 2024-08-30

## 2024-08-30 NOTE — TELEPHONE ENCOUNTER
[facility] Prior Authorization Team Request    Medication:  MIRABEGRON ER 25MG TABS  Dosing: TAKE ONE TABLET BY MOUTH DAILY.   NDC (required for Medicaid members): [address]-39  Insurance Company: [employer]    BIN: [id number]  PCN: ROSIE  Grp: [id number]  ID: [id number]  CoverMyMeds Key (if applicable):   Additional documentation:   Pharmacy Filling the Rx:  [facility]  Filling Pharmacy Phone:  [phone]  Contact:  [facility]ARM [facility] PA (P [phone]) please send all responses to this pool.  Pharmacy NPI (required for Medicaid members): [id number]

## 2024-09-03 NOTE — TELEPHONE ENCOUNTER
Central Prior Authorization Team   Phone: [phone]    PA Initiation    Medication: MIRABEGRON ER 25MG TABS  Insurance Company: [employer] - Phone [phone] Fax [phone]  Pharmacy Filling the Rx: [id number] - [place] - [address] [facility] [address]  Filling Pharmacy Phone: [phone]  Filling Pharmacy Fax:    Start Date: 9/3/2024

## 2024-09-04 RX ORDER — SOLIFENACIN SUCCINATE 10 MG/1
10 TABLET, FILM COATED ORAL DAILY
Qty: 90 TABLET | Refills: 1 | Status: SHIPPED | OUTPATIENT
Start: 2024-09-04

## 2024-09-04 NOTE — TELEPHONE ENCOUNTER
Prior Authorization Not Needed per Insurance    Medication: MIRABEGRON ER 25MG TABS-PA NOT NEEDED   Insurance Company: [employer] - Phone [phone] Fax [phone]  Expected CoPay:      Pharmacy Filling the Rx: [facility] [place], [place] - [address] [place] [address] [phone]  Pharmacy Notified:  Yes  Patient Notified:  No    Filling pharmacy stated that PA is Not Needed at This Time, Provider/clinic staff canceled fill on medication. Be Advised: Reached out to other Pharmacy  [id number] - [place], [place] - [address] AT [facility] [address] which script was initially sent to and pharmacy stated that Brand Myrbetriq is preferred and covered. However, patients co-pay is high. Quantity 90 is $934 Cash price Quantity 90 is $1,675 as a result patient did not pickup medication.

## 2025-04-17 NOTE — TELEPHONE ENCOUNTER
[facility] Call Center    Phone Message    May a detailed message be left on voicemail: yes     Reason for Call: Other: . Pt is requesting her mybetriq to be sent to [facility] [address] [place] [address], [place] [address], thank you!    Action Taken: Message routed to:  Other: obgyn    Travel Screening: Not Applicable     Date of Service:                                                                     
[facility] Call Center    Phone Message    May a detailed message be left on voicemail: yes     Reason for Call: Other: Patient would like a call back about below      Action Taken: Message routed to:  Other: uro    Travel Screening: Not Applicable     Date of Service:                                                                     
Medication switched to preferred pharmacy  
Spoke with Dr. Helton, who would liek to stay on Vesicare 10mg because myrbetriq is too expensive. I did let her know that a PA is currently in progress, but she stated that her side effects on the vesicare were manageable so she would just like to stay on the vesicare. Routed med to Dr. Naranjo for approval.  
CONSTITUTIONAL: Well-appearing; well-nourished; in no apparent distress.   EYES: PERRL; EOM intact.   ENT: normal nose; no rhinorrhea; normal pharynx with no tonsillar hypertrophy.   NECK: Supple; non-tender; no cervical lymphadenopathy.   CARDIOVASCULAR: Normal S1, S2; no murmurs, rubs, or gallops.   RESPIRATORY: Normal chest excursion with respiration; breath sounds clear and equal bilaterally; no wheezes, rhonchi, or rales.  GI/: Normal bowel sounds; non-distended; non-tender; no palpable organomegaly.   MS: No evidence of trauma or deformity. Normal ROM in all four extremities; non-tender to palpation; distal pulses are normal. No midline spinal tenderness.  Stable pelvis.  Strength equal bilateral upper and lower extremity.  SKIN: Normal for age and race; warm; dry; good turgor; no apparent lesions or exudate.   NEURO/PSYCH: A & O x 4; grossly unremarkable. No focal deficits.  Normal gait.

## (undated) DEVICE — SOL NACL 0.9% IRRIG 500ML BOTTLE 2F7123

## (undated) DEVICE — SUCTION MANIFOLD NEPTUNE 2 SYS 4 PORT 0702-020-000

## (undated) DEVICE — CATH TRAY FOLEY SURESTEP 16FR W/DRAIN BAG LF A300416A

## (undated) DEVICE — SYR BULB IRRIG 50ML LATEX FREE 0035280

## (undated) DEVICE — LIGHT HANDLE X1 31140133

## (undated) DEVICE — TUBING CYSTO/BLADDER IRRIG SET 80" 06544-01

## (undated) DEVICE — ESU GROUND PAD ADULT W/CORD E7507

## (undated) DEVICE — NDL COUNTER 20CT 31142493

## (undated) DEVICE — TUBING SUCTION 12"X1/4" N612

## (undated) DEVICE — CATH FOLEY 18FR 5ML SILICONE LUBRI-SIL 175818

## (undated) DEVICE — PAD CHUX UNDERPAD 30X36" P3036C

## (undated) DEVICE — LINEN TOWEL PACK X5 5464

## (undated) DEVICE — SOL WATER IRRIG 500ML BOTTLE 2F7113

## (undated) DEVICE — ESU ELEC NDL 1" E1552

## (undated) DEVICE — ADH SKIN CLOSURE PREMIERPRO EXOFIN 1.0ML 3470

## (undated) DEVICE — SOL NACL 0.9% INJ 1000ML BAG 2B1324X

## (undated) DEVICE — PACK VAG HYST

## (undated) DEVICE — COVER CAMERA IN-LIGHT DISP LT-C02

## (undated) DEVICE — GLOVE PROTEXIS W/NEU-THERA 7.0  2D73TE70

## (undated) DEVICE — PREP CHLORHEXIDINE 4% 4OZ (HIBICLENS) 57504

## (undated) DEVICE — GLOVE PROTEXIS BLUE W/NEU-THERA 7.5  2D73EB75

## (undated) DEVICE — SUCTION TIP YANKAUER W/O VENT K86

## (undated) DEVICE — BLADE KNIFE SURG 15 371115

## (undated) DEVICE — TUBING SUCTION MEDI-VAC 1/4"X20' N620A

## (undated) DEVICE — ESU PENCIL W/COATED BLADE E2450H

## (undated) RX ORDER — CEFAZOLIN SODIUM 2 G/50ML
SOLUTION INTRAVENOUS
Status: DISPENSED
Start: 2022-10-31

## (undated) RX ORDER — FENTANYL CITRATE 50 UG/ML
INJECTION, SOLUTION INTRAMUSCULAR; INTRAVENOUS
Status: DISPENSED
Start: 2022-10-31

## (undated) RX ORDER — PROPOFOL 10 MG/ML
INJECTION, EMULSION INTRAVENOUS
Status: DISPENSED
Start: 2022-10-31

## (undated) RX ORDER — ACETAMINOPHEN 325 MG/1
TABLET ORAL
Status: DISPENSED
Start: 2022-10-31

## (undated) RX ORDER — LIDOCAINE HYDROCHLORIDE AND EPINEPHRINE 10; 10 MG/ML; UG/ML
INJECTION, SOLUTION INFILTRATION; PERINEURAL
Status: DISPENSED
Start: 2022-10-31

## (undated) RX ORDER — ONDANSETRON 2 MG/ML
INJECTION INTRAMUSCULAR; INTRAVENOUS
Status: DISPENSED
Start: 2022-10-31